# Patient Record
Sex: MALE | Race: WHITE | NOT HISPANIC OR LATINO | Employment: FULL TIME | URBAN - METROPOLITAN AREA
[De-identification: names, ages, dates, MRNs, and addresses within clinical notes are randomized per-mention and may not be internally consistent; named-entity substitution may affect disease eponyms.]

---

## 2017-12-01 ENCOUNTER — GENERIC CONVERSION - ENCOUNTER (OUTPATIENT)
Dept: OTHER | Facility: OTHER | Age: 52
End: 2017-12-01

## 2017-12-02 ENCOUNTER — APPOINTMENT (EMERGENCY)
Dept: RADIOLOGY | Facility: HOSPITAL | Age: 52
End: 2017-12-02
Payer: COMMERCIAL

## 2017-12-02 ENCOUNTER — HOSPITAL ENCOUNTER (EMERGENCY)
Facility: HOSPITAL | Age: 52
Discharge: HOME | End: 2017-12-02
Attending: EMERGENCY MEDICINE
Payer: COMMERCIAL

## 2017-12-02 VITALS
HEIGHT: 70 IN | OXYGEN SATURATION: 98 % | RESPIRATION RATE: 16 BRPM | WEIGHT: 232 LBS | BODY MASS INDEX: 33.21 KG/M2 | DIASTOLIC BLOOD PRESSURE: 89 MMHG | HEART RATE: 80 BPM | SYSTOLIC BLOOD PRESSURE: 177 MMHG

## 2017-12-02 DIAGNOSIS — R20.2 PARESTHESIAS: Primary | ICD-10-CM

## 2017-12-02 DIAGNOSIS — M47.812 DJD (DEGENERATIVE JOINT DISEASE) OF CERVICAL SPINE: ICD-10-CM

## 2017-12-02 DIAGNOSIS — M47.817 DJD (DEGENERATIVE JOINT DISEASE), LUMBOSACRAL: ICD-10-CM

## 2017-12-02 LAB
ANION GAP SERPL CALCULATED.3IONS-SCNC: 5 MMOL/L (ref 4–13)
BASOPHILS # BLD AUTO: 0.03 THOUSANDS/ΜL (ref 0–0.1)
BASOPHILS NFR BLD AUTO: 0 % (ref 0–1)
BUN SERPL-MCNC: 11 MG/DL (ref 5–25)
CA-I BLD-SCNC: 1.17 MMOL/L (ref 1.12–1.32)
CALCIUM SERPL-MCNC: 9 MG/DL (ref 8.3–10.1)
CHLORIDE SERPL-SCNC: 107 MMOL/L (ref 100–108)
CO2 SERPL-SCNC: 29 MMOL/L (ref 21–32)
CREAT SERPL-MCNC: 0.89 MG/DL (ref 0.6–1.3)
EOSINOPHIL # BLD AUTO: 0.13 THOUSAND/ΜL (ref 0–0.61)
EOSINOPHIL NFR BLD AUTO: 2 % (ref 0–6)
ERYTHROCYTE [DISTWIDTH] IN BLOOD BY AUTOMATED COUNT: 12.7 % (ref 11.6–15.1)
GFR SERPL CREATININE-BSD FRML MDRD: 98 ML/MIN/1.73SQ M
GLUCOSE SERPL-MCNC: 92 MG/DL (ref 65–140)
HCT VFR BLD AUTO: 44.4 % (ref 36.5–49.3)
HGB BLD-MCNC: 15.2 G/DL (ref 12–17)
LYMPHOCYTES # BLD AUTO: 2.63 THOUSANDS/ΜL (ref 0.6–4.47)
LYMPHOCYTES NFR BLD AUTO: 30 % (ref 14–44)
MCH RBC QN AUTO: 30.1 PG (ref 26.8–34.3)
MCHC RBC AUTO-ENTMCNC: 34.2 G/DL (ref 31.4–37.4)
MCV RBC AUTO: 88 FL (ref 82–98)
MONOCYTES # BLD AUTO: 0.61 THOUSAND/ΜL (ref 0.17–1.22)
MONOCYTES NFR BLD AUTO: 7 % (ref 4–12)
NEUTROPHILS # BLD AUTO: 5.37 THOUSANDS/ΜL (ref 1.85–7.62)
NEUTS SEG NFR BLD AUTO: 61 % (ref 43–75)
NRBC BLD AUTO-RTO: 0 /100 WBCS
PLATELET # BLD AUTO: 243 THOUSANDS/UL (ref 149–390)
PMV BLD AUTO: 10.9 FL (ref 8.9–12.7)
POTASSIUM SERPL-SCNC: 4.2 MMOL/L (ref 3.5–5.3)
RBC # BLD AUTO: 5.05 MILLION/UL (ref 3.88–5.62)
SODIUM SERPL-SCNC: 141 MMOL/L (ref 136–145)
WBC # BLD AUTO: 8.79 THOUSAND/UL (ref 4.31–10.16)

## 2017-12-02 PROCEDURE — 72100 X-RAY EXAM L-S SPINE 2/3 VWS: CPT

## 2017-12-02 PROCEDURE — 70450 CT HEAD/BRAIN W/O DYE: CPT

## 2017-12-02 PROCEDURE — 72125 CT NECK SPINE W/O DYE: CPT

## 2017-12-02 PROCEDURE — 82330 ASSAY OF CALCIUM: CPT | Performed by: EMERGENCY MEDICINE

## 2017-12-02 PROCEDURE — 85025 COMPLETE CBC W/AUTO DIFF WBC: CPT | Performed by: EMERGENCY MEDICINE

## 2017-12-02 PROCEDURE — 99284 EMERGENCY DEPT VISIT MOD MDM: CPT

## 2017-12-02 PROCEDURE — 36415 COLL VENOUS BLD VENIPUNCTURE: CPT | Performed by: EMERGENCY MEDICINE

## 2017-12-02 PROCEDURE — 80048 BASIC METABOLIC PNL TOTAL CA: CPT | Performed by: EMERGENCY MEDICINE

## 2017-12-02 RX ORDER — DICYCLOMINE HCL 20 MG
20 TABLET ORAL EVERY 8 HOURS PRN
COMMUNITY

## 2017-12-02 NOTE — DISCHARGE INSTRUCTIONS
Your CT scans, x-ray, and blood work showed chronic changes in the spine without new findings  This may be related to your numbness and should be followed further by your family doctor and Neurology  Follow up with them, as soon as possible  Return to the ER for new or worrisome symptoms  Degenerative Disc Disease   WHAT YOU NEED TO KNOW:   Degenerative disc disease happens when one or more discs between the vertebrae (bones in your spine) wear down  Discs act like a cushion between your vertebrae and help to stabilize your spine  Degenerative disc disease commonly occurs in the neck or lower back as you get older  DISCHARGE INSTRUCTIONS:   Medicines: You may need the following:  · NSAIDs , such as ibuprofen, help decrease swelling, pain, and fever  This medicine is available with or without a doctor's order  NSAIDs can cause stomach bleeding or kidney problems in certain people  If you take blood thinner medicine, always ask your healthcare provider if NSAIDs are safe for you  Always read the medicine label and follow directions  · Acetaminophen  decreases pain  It is available without a doctor's order  Ask how much to take and how often to take it  Follow directions  Acetaminophen can cause liver damage if not taken correctly  · Prescription pain medicine  may be given  Ask how to take this medicine safely  · Take your medicine as directed  Contact your healthcare provider if you think your medicine is not helping or if you have side effects  Tell him or her if you are allergic to any medicine  Keep a list of the medicines, vitamins, and herbs you take  Include the amounts, and when and why you take them  Bring the list or the pill bottles to follow-up visits  Carry your medicine list with you in case of an emergency  Follow up with your healthcare provider as directed:  Write down your questions so you remember to ask them during your visits    Go to physical therapy as directed:  A physical therapist will help you find stretches and exercises to decrease pain and improve movement and strength  He may also do spinal decompression to stretch and open the area between your vertebra  Manage your symptoms:   · Avoid activities that make your symptoms worse  Ask your healthcare provider for ways to decrease your symptoms  Certain stretches or exercises may relieve your symptoms  Ask how to stay active without further injury  · Apply heat or ice as directed  Heat or ice may help decrease pain, inflammation, and muscle spasms  · Maintain a healthy weight  If you are overweight, weight loss may help improve your symptoms  Ask your healthcare provider to help you create a weight loss plan if you are overweight  · Find ways to manage your stress  Behavioral therapy may help you learn ways to manage stress and decrease pain  Ask for more information about behavioral therapy  · Do not smoke  If you smoke, it is never too late to quit  Ask for information if you need help quitting  Contact your healthcare provider if:   · Your pain gets worse, or you cannot control it with pain medicine  · Your symptoms get worse  · You have questions or concerns about your condition or care  Return to the emergency department if:   · You have severe pain or weakness, or you cannot move your arm or leg  · You lose control of your bladder or bowels  © 2017 [employer]  Information is for End User's use only and may not be sold, redistributed or otherwise used for commercial purposes  All illustrations and images included in CareNotes® are the copyrighted property of [employer] D A [facility] , [employer]  or Aden Mendiola  The above information is an  only  It is not intended as medical advice for individual conditions or treatments  Talk to your doctor, nurse or pharmacist before following any medical regimen to see if it is safe and effective for you      Paresthesia   WHAT YOU NEED TO KNOW:   Paresthesia is numbness and tingling  It can happen in any part of your body, but usually occurs in your legs, feet, arms, or hands  There are a large number of conditions that can cause paresthesia  It affects the nerves that provide sensation and happens when there are changes in nerves or nerve pathways  These changes can be temporary, such as if you take certain medicines or you are not getting enough vitamin B  Paresthesia can become permanent when there is nerve damage  Conditions that may cause nerve damage include diabetes, carpel tunnel syndrome, stroke, and multiple sclerosis  The exact cause of your paresthesia may be unknown  DISCHARGE INSTRUCTIONS:   Medicines:  Ask for more information about medicines you may need to treat the condition causing your paresthesias  · NSAIDs  help decrease swelling and pain or fever  This medicine is available with or without a doctor's order  NSAIDs can cause stomach bleeding or kidney problems in certain people  If you take blood thinner medicine, always ask your healthcare provider if NSAIDs are safe for you  Always read the medicine label and follow directions  · Take your medicine as directed  Contact your healthcare provider if you think your medicine is not helping or if you have side effects  Tell him or her if you are allergic to any medicine  Keep a list of the medicines, vitamins, and herbs you take  Include the amounts, and when and why you take them  Bring the list or the pill bottles to follow-up visits  Carry your medicine list with you in case of an emergency  Follow up with your healthcare provider or neurologist as directed:  Write down your questions so you remember to ask them during your visits  Contact your healthcare provider or neurologist if:   · Your symptoms do not improve  · You have symptoms in more than one part of your body  · You have questions about your condition or care    Return to the emergency department if: · You have any of the following signs of a stroke:      ¨ Numbness or drooping on one side of your face     ¨ Weakness in an arm or leg    ¨ Confusion or difficulty speaking    ¨ Dizziness, a severe headache, or vision loss    · You are not able to control your urine or bowel movements  · You have severe pain along with numbness and tingling  · Your legs suddenly become cold  You have trouble moving your legs, and they ache  · You have increased weakness in a part of your body  · You have uncontrolled movements, or you have a seizure  © 2017 [employer]  Information is for End User's use only and may not be sold, redistributed or otherwise used for commercial purposes  All illustrations and images included in CareNotes® are the copyrighted property of A D A [employer]  or [employer]  The above information is an  only  It is not intended as medical advice for individual conditions or treatments  Talk to your doctor, nurse or pharmacist before following any medical regimen to see if it is safe and effective for you

## 2017-12-02 NOTE — ED PROVIDER NOTES
History  Chief Complaint   Patient presents with    Numbness     Pt c/o numbness and tingling in bilateral legs for about one year or so but this past Tuesday or Wednesday patient started with bilateral arm tingling and numbness  Pt c/o bilateral knee/leg pain  50-year-old man with a history of IBS presents for evaluation of numbness  Patient reports a 1-2 year history of intermittent numbness involving the anterolateral aspect of bilateral thighs  He has developed numbness involving bilateral upper extremities and head that started over the past week without inciting event  Patient denies associated neck pain and new back pain  No associated fevers, chills, night sweats, weakness, saddle anesthesia, or loss of bladder/bowel function  He has been followed by his PCP for this issue and was recently referred to Neurology for further evaluation  He spoke with his primary doctor today who recommended that he come to the ED for evaluation  He denies modifying factors  He is able to ambulate normally and recently ran a 5K  No recent tight fitting belts or other clothing  No recent trauma  On arrival, patient is hypertensive to 177/89 with otherwise normal vital signs  Physical exam is unremarkable including a normal neurologic exam including sensory testing  No clear etiology at this time  Will check basic labs to evaluate for electrolyte abnormalities  Will check lumbar x-ray and CT head/C-spine to evaluate for masses and bony lesions that may be contributing to symptoms  Prior to Admission Medications   Prescriptions Last Dose Informant Patient Reported?  Taking?   dicyclomine (BENTYL) 20 mg tablet 12/1/2017 at Unknown time  Yes Yes   Sig: Take 20 mg by mouth every 8 (eight) hours as needed      Facility-Administered Medications: None       Past Medical History:   Diagnosis Date    IBS (irritable bowel syndrome)        Past Surgical History:   Procedure Laterality Date    COLONOSCOPY W/ POLYPECTOMY         Family History   Problem Relation Age of Onset    Heart disease Mother     Heart disease Father      I have reviewed and agree with the history as documented  Social History   Substance Use Topics    Smoking status: Never Smoker    Smokeless tobacco: Never Used      Comment: pt is a non-smoker    Alcohol use Yes      Comment: occasional social drink        Review of Systems   Constitutional: Negative for chills and fever  HENT: Negative for rhinorrhea and sore throat  Eyes: Negative for photophobia and visual disturbance  Respiratory: Negative for cough and shortness of breath  Cardiovascular: Negative for chest pain and leg swelling  Gastrointestinal: Positive for diarrhea  Negative for abdominal pain, blood in stool, nausea and vomiting  Genitourinary: Negative for difficulty urinating, dysuria, frequency and hematuria  Musculoskeletal: Negative for back pain, gait problem and neck pain  Skin: Negative for rash and wound  Neurological: Positive for numbness  Negative for weakness, light-headedness and headaches  Physical Exam  ED Triage Vitals   Temp Pulse Respirations Blood Pressure SpO2   -- 12/02/17 0858 12/02/17 0858 12/02/17 0901 12/02/17 0858    80 16 (!) 177/89 98 %      Temp src Heart Rate Source Patient Position - Orthostatic VS BP Location FiO2 (%)   -- 12/02/17 0858 12/02/17 0858 12/02/17 0858 --    Monitor Sitting Left arm       Pain Score       12/02/17 0858       No Pain           Orthostatic Vital Signs  Vitals:    12/02/17 0858 12/02/17 0901   BP:  (!) 177/89   Pulse: 80    Patient Position - Orthostatic VS: Sitting Sitting       Physical Exam   Constitutional: He is oriented to person, place, and time  He appears well-developed and well-nourished  No distress  HENT:   Head: Normocephalic and atraumatic  Eyes: Conjunctivae and EOM are normal  Pupils are equal, round, and reactive to light  No scleral icterus  Neck: Neck supple   No JVD present  Cardiovascular: Normal rate, regular rhythm and normal heart sounds  Exam reveals no gallop and no friction rub  No murmur heard  Pulmonary/Chest: Effort normal and breath sounds normal  No respiratory distress  He has no wheezes  He has no rales  Abdominal: Soft  He exhibits no distension  There is no tenderness  There is no rebound and no guarding  Musculoskeletal: He exhibits no edema or tenderness  Neurological: He is alert and oriented to person, place, and time  He displays normal reflexes  No cranial nerve deficit  No gross motor or sensory deficits, standing and ambulating without difficulty   Skin: Skin is warm and dry  He is not diaphoretic  Psychiatric: He has a normal mood and affect  His behavior is normal  Thought content normal    Vitals reviewed  ED Medications  Medications - No data to display    Diagnostic Studies  Results Reviewed     Procedure Component Value Units Date/Time    Basic metabolic panel [[id number]] Collected:  12/02/17 1251    Lab Status:  Final result Specimen:  Blood from Arm, Left Updated:  12/02/17 1322     Sodium 141 mmol/L      Potassium 4 2 mmol/L      Chloride 107 mmol/L      CO2 29 mmol/L      Anion Gap 5 mmol/L      BUN 11 mg/dL      Creatinine 0 89 mg/dL      Glucose 92 mg/dL      Calcium 9 0 mg/dL      eGFR 98 ml/min/1 73sq m     Narrative:         National Kidney Disease Education Program recommendations are as follows:  GFR calculation is accurate only with a steady state creatinine  Chronic Kidney disease less than 60 ml/min/1 73 sq  meters  Kidney failure less than 15 ml/min/1 73 sq  meters      Calcium, ionized [[id number]]  (Normal) Collected:  12/02/17 1251    Lab Status:  Final result Specimen:  Blood from Arm, Left Updated:  12/02/17 1313     Calcium, Ionized 1 17 mmol/L     CBC and differential [[id number]]  (Normal) Collected:  12/02/17 1251    Lab Status:  Final result Specimen:  Blood from Arm, Left Updated:  12/02/17 1310     WBC 8 79 Thousand/uL      RBC 5 05 Million/uL      Hemoglobin 15 2 g/dL      Hematocrit 44 4 %      MCV 88 fL      MCH 30 1 pg      MCHC 34 2 g/dL      RDW 12 7 %      MPV 10 9 fL      Platelets 513 Thousands/uL      nRBC 0 /100 WBCs      Neutrophils Relative 61 %      Lymphocytes Relative 30 %      Monocytes Relative 7 %      Eosinophils Relative 2 %      Basophils Relative 0 %      Neutrophils Absolute 5 37 Thousands/µL      Lymphocytes Absolute 2 63 Thousands/µL      Monocytes Absolute 0 61 Thousand/µL      Eosinophils Absolute 0 13 Thousand/µL      Basophils Absolute 0 03 Thousands/µL                  XR spine lumbar 2 or 3 views injury   ED Interpretation by Nayla Mcpherson MD (12/02 1327)   DJD with decreased disc space L4-L5 and L5-S1  No other findings observed  Final Result by Rufino Bellamy MD (12/02 1501)         1  No acute osseous abnormality  2  Moderate degenerative disc disease at L4-L5 and mild degenerative disc disease at L5-S1  Workstation performed: [id number]         CT spine cervical without contrast   Final Result by Tam Badillo MD (12/02 1303)      No cervical spine fracture or traumatic malalignment  Workstation performed: [id number]         CT head without contrast   Final Result by Tam Badillo MD (12/02 1300)      No acute intracranial abnormality  Workstation performed: [id number]               Procedures  Procedures      Phone Consults  ED Phone Contact    ED Course  ED Course                                MDM  Number of Diagnoses or Management Options  DJD (degenerative joint disease) of cervical spine:   DJD (degenerative joint disease), lumbosacral:   Paresthesias:   Diagnosis management comments: Workup including CT head/c-spine, xray l-spine, and basic labs unremarkable for acute findings  No weakness, saddle anesthesia, or bladder/bowel incontinence  Unclear etiology at this time   Patient was discharged with outpatient PCP and neurology follow up  CritCare Time    Disposition  Final diagnoses:   DJD (degenerative joint disease) of cervical spine   DJD (degenerative joint disease), lumbosacral   Paresthesias     Time reflects when diagnosis was documented in both MDM as applicable and the Disposition within this note     Time User Action Codes Description Comment    12/2/2017  1:30 PM IselaadriJyotsna castillo Running Add [A33 087] DJD (degenerative joint disease) of cervical spine     12/2/2017  1:30 PM Jyotsna Singh Running Add [M51 37] DJD (degenerative joint disease), lumbosacral     12/2/2017  1:30 PM DhirajongmichadriJyotsna castillo Running Add [R20 2] Paresthesias     12/2/2017  1:30 PM Jyotsna Singh Running Modify [Y47 736] DJD (degenerative joint disease) of cervical spine     12/2/2017  1:30 PM IselaadriJyotsna castillo Running Modify [R20 2] Paresthesias       ED Disposition     ED Disposition Condition Comment    Discharge  Karlie Osorio [facility] discharge to home/self care  Condition at discharge: Good        Follow-up Information     Follow up With Specialties Details Why Contact Info    Tashia Cisneros MD Internal Medicine  As needed [facility] [id number]  [address] Neurology DEPARTMENT OF [facility]  Schedule an appointment as soon as possible for a visit For further evaluation and treatment Sunny [address] 53  [phone]        Discharge Medication List as of 12/2/2017  1:32 PM      CONTINUE these medications which have NOT CHANGED    Details   dicyclomine (BENTYL) 20 mg tablet Take 20 mg by mouth every 8 (eight) hours as needed, Historical Med           No discharge procedures on file  ED Provider  Attending physically available and evaluated Royce Lee  CECILIA managed the patient along with the ED Attending      Electronically Signed by         Adalgisa Jorgensen MD  Resident  12/03/17 2361

## 2017-12-02 NOTE — ED ATTENDING ATTESTATION
Lowell Bledsoe DO, saw and evaluated the patient  I have discussed the patient with the resident/non-physician practitioner and agree with the resident's/non-physician practitioner's findings, Plan of Care, and MDM as documented in the resident's/non-physician practitioner's note, except where noted  All available labs and Radiology studies were reviewed  At this point I agree with the current assessment done in the Emergency Department  I have conducted an independent evaluation of this patient a history and physical is as follows:    47 yom with numbness and tingling with paresthesia in bilateral anterior thighs worsening over the course of several years  +h/o L2-3 DJD  Now for one week has also had tingling in bilateral arms and face  His PCP evaluated him and referred him to neurology and his working on an office appointment  Last night, symptoms worsened and he called his PCP who referred him to the ED    Symptoms worsen with standing but did not limit him from recently running a 5k  Patient states that his symptoms have all resolved at this time  Paresthesia in ant right thigh reproduced with ambulation    No urinary incontinence or saddle anesthesia    BP (!) 177/89   Pulse 80   Resp 16   Ht 5' 10" (1 778 m)   Wt 105 kg (232 lb)   SpO2 98%   BMI 33 29 kg/m²   Neuro intact  Ambulates well  No respiratory distress  Heart regular  abd benign  Ambulates well       differential diagnosis includes degenerative joint disease, degenerative disc disease, arthritis, spinal stenosis, spondylolisthesis, herniated discs, metastatic cancer to spine, Ms, other neurological disorder  Based on the patient's arrangements to follow up with Neurology, we will screen him with basic imaging including CT of head, C-spine, x-ray of L-spine, and basic lab work  The patient is stable and will have to follow up with Neurology for more detailed imaging, such as MRI of brain and spine  If indicated     CT of head, C-spine were negative; x-ray of lumbar spine was negative  He ambulated well    He was happy with evaluation and willing to follow up with his neurologist that he was recently referred to      diagnosis   diffuse, intermittent paresthesias    Critical Care Time  CritCare Time

## 2017-12-02 NOTE — ED PROVIDER NOTES
History  Chief Complaint   Patient presents with    Numbness     Pt c/o numbness and tingling in bilateral legs for about one year or so but this past Tuesday or Wednesday patient started with bilateral arm tingling and numbness  Pt c/o bilateral knee/leg pain  48 y/o M presents for evaluation of LE numbness  Patient states that he gets numbness intermittently in lateral anterior thigh  Initially started R, now bilateral  Usually associated with long standing, better with sitting  No medications tried at home for symptomatic relief  Spoke to PCP previously, was placed on Vitamin D without resolution  Pt saw neurology of Tuesday and             None       Past Medical History:   Diagnosis Date    IBS (irritable bowel syndrome)        Past Surgical History:   Procedure Laterality Date    COLONOSCOPY W/ POLYPECTOMY         Family History   Problem Relation Age of Onset    Heart disease Mother     Heart disease Father      I have reviewed and agree with the history as documented      Social History   Substance Use Topics    Smoking status: Never Smoker    Smokeless tobacco: Never Used      Comment: pt is a non-smoker    Alcohol use Yes      Comment: occasional social drink        Review of Systems    Physical Exam  ED Triage Vitals   Temp Pulse Respirations Blood Pressure SpO2   -- 12/02/17 0858 12/02/17 0858 12/02/17 0901 12/02/17 0858    80 16 (!) 177/89 98 %      Temp src Heart Rate Source Patient Position - Orthostatic VS BP Location FiO2 (%)   -- 12/02/17 0858 12/02/17 0858 12/02/17 0858 --    Monitor Sitting Left arm       Pain Score       12/02/17 0858       No Pain           Orthostatic Vital Signs  Vitals:    12/02/17 0858 12/02/17 0901   BP:  (!) 177/89   Pulse: 80    Patient Position - Orthostatic VS: Sitting Sitting       Physical Exam    ED Medications  Medications - No data to display    Diagnostic Studies  Results Reviewed     None                 No orders to display Procedures  Procedures      Phone Consults  ED Phone Contact    ED Course  ED Course                                MDM  CritCare Time    Disposition  Final diagnoses:   None     ED Disposition     None      Follow-up Information    None       Patient's Medications    No medications on file     No discharge procedures on file  ED Provider  Attending physically available and evaluated Bela Puri I managed the patient along with the ED Attending      Electronically Signed by

## 2017-12-06 ENCOUNTER — TRANSCRIBE ORDERS (OUTPATIENT)
Dept: ADMINISTRATIVE | Facility: HOSPITAL | Age: 52
End: 2017-12-06

## 2017-12-06 DIAGNOSIS — M54.16 LUMBAR RADICULOPATHY: ICD-10-CM

## 2017-12-06 DIAGNOSIS — M54.5 LOW BACK PAIN, UNSPECIFIED BACK PAIN LATERALITY, UNSPECIFIED CHRONICITY, WITH SCIATICA PRESENCE UNSPECIFIED: ICD-10-CM

## 2017-12-06 DIAGNOSIS — R20.2 PARESTHESIA: Primary | ICD-10-CM

## 2017-12-08 ENCOUNTER — GENERIC CONVERSION - ENCOUNTER (OUTPATIENT)
Dept: OTHER | Facility: OTHER | Age: 52
End: 2017-12-08

## 2017-12-08 ENCOUNTER — HOSPITAL ENCOUNTER (OUTPATIENT)
Dept: MRI IMAGING | Facility: HOSPITAL | Age: 52
Discharge: HOME | End: 2017-12-08
Payer: COMMERCIAL

## 2017-12-08 DIAGNOSIS — M54.16 LUMBAR RADICULOPATHY: ICD-10-CM

## 2017-12-08 DIAGNOSIS — R20.2 PARESTHESIA: ICD-10-CM

## 2017-12-08 DIAGNOSIS — M54.5 LOW BACK PAIN, UNSPECIFIED BACK PAIN LATERALITY, UNSPECIFIED CHRONICITY, WITH SCIATICA PRESENCE UNSPECIFIED: ICD-10-CM

## 2017-12-08 PROCEDURE — 72148 MRI LUMBAR SPINE W/O DYE: CPT

## 2018-01-03 ENCOUNTER — GENERIC CONVERSION - ENCOUNTER (OUTPATIENT)
Dept: OTHER | Facility: OTHER | Age: 53
End: 2018-01-03

## 2018-01-03 DIAGNOSIS — M47.816 SPONDYLOSIS OF LUMBAR REGION WITHOUT MYELOPATHY OR RADICULOPATHY: ICD-10-CM

## 2018-01-17 ENCOUNTER — GENERIC CONVERSION - ENCOUNTER (OUTPATIENT)
Dept: OTHER | Facility: OTHER | Age: 53
End: 2018-01-17

## 2018-01-17 ENCOUNTER — APPOINTMENT (OUTPATIENT)
Dept: PHYSICAL THERAPY | Facility: CLINIC | Age: 53
End: 2018-01-17
Payer: COMMERCIAL

## 2018-01-17 PROCEDURE — 97162 PT EVAL MOD COMPLEX 30 MIN: CPT

## 2018-01-17 PROCEDURE — 97535 SELF CARE MNGMENT TRAINING: CPT

## 2018-01-17 PROCEDURE — G8990 OTHER PT/OT CURRENT STATUS: HCPCS

## 2018-01-17 PROCEDURE — G8991 OTHER PT/OT GOAL STATUS: HCPCS

## 2018-01-22 ENCOUNTER — APPOINTMENT (OUTPATIENT)
Dept: PHYSICAL THERAPY | Facility: CLINIC | Age: 53
End: 2018-01-22
Payer: COMMERCIAL

## 2018-01-22 PROCEDURE — 97110 THERAPEUTIC EXERCISES: CPT

## 2018-01-24 VITALS
BODY MASS INDEX: 33.34 KG/M2 | WEIGHT: 238.13 LBS | DIASTOLIC BLOOD PRESSURE: 88 MMHG | SYSTOLIC BLOOD PRESSURE: 136 MMHG | RESPIRATION RATE: 16 BRPM | HEIGHT: 71 IN | TEMPERATURE: 98.1 F | HEART RATE: 89 BPM

## 2018-01-25 ENCOUNTER — OFFICE VISIT (OUTPATIENT)
Dept: PHYSICAL THERAPY | Facility: CLINIC | Age: 53
End: 2018-01-25
Payer: COMMERCIAL

## 2018-01-25 DIAGNOSIS — G89.29 CHRONIC BILATERAL LOW BACK PAIN WITH RIGHT-SIDED SCIATICA: Primary | ICD-10-CM

## 2018-01-25 DIAGNOSIS — M54.41 CHRONIC BILATERAL LOW BACK PAIN WITH RIGHT-SIDED SCIATICA: Primary | ICD-10-CM

## 2018-01-25 PROCEDURE — 97110 THERAPEUTIC EXERCISES: CPT

## 2018-01-25 NOTE — PROGRESS NOTES
Daily Note     Today's date: 2018  Patient name: Elena Amaya  : 1965  MRN: [id number]  Referring provider: Fadumo Reis  Dx:   Encounter Diagnosis   Name Primary?  Chronic bilateral low back pain with right-sided sciatica Yes                  Subjective: Patient states he has increased centralized low back pain after spending two days in [place] and in transit  He reports continuing numbness through hip and thigh  Pain is rated 5/10 coming into therapy today  Objective: Some progression of program today with addition of Supine ABD crunch and progression to yellow ball  Instructed patient in seated sciatic nerve glides to reduce radiculopathy  A printed handout was provided to the patient  Patient demonstrates good understanding of exercises  No exacerbation of symptoms is noted throughout TE today  See treatment diary below  Precautions: None    Daily Treatment Diary     Manual              N/A N/A                                                                    Exercise Diary              Treadmill 3 4 mph x 10'            Elliptical             TB Trunk Rotation             Hip AB/AD AB 60#  AD 60#  2x10            Seated Tball LAQ 2 0# 2x10            Seated Tball March 2 0# 2x10            Seated Tball Trunk Rotation Yellow 2x10            Seated Tball D1 D2 Rotation Yellow 2x10            Bosu March             BOSU SLR             Hamstring Stretch HEP            Piriformis, Seated & Supine Modified HEP            Seated Sciatic Nerve Glides 20 HEP            Prone OAL             Supine Tball ABD Crunch Orange 2x10 3 sec            PPT             Bridge w/ Tball 2x10            S/L Bridge             DKTC w/ Tball 10X            LTR w/ Tball 10x                Modalities              MHP LB Seated x15 min                                                Assessment: Tolerated treatment well  Patient exhibited good technqiue with therapeutic exercises   No exacerbation of symptoms with progression of program       Plan: Progress treatment as tolerated  Patient will benefit from continued skilled therapy to improve lumbar stabilization, increase strength and reduce pain & LOF

## 2018-01-29 ENCOUNTER — OFFICE VISIT (OUTPATIENT)
Dept: PHYSICAL THERAPY | Facility: CLINIC | Age: 53
End: 2018-01-29
Payer: COMMERCIAL

## 2018-01-29 DIAGNOSIS — M54.41 CHRONIC BILATERAL LOW BACK PAIN WITH RIGHT-SIDED SCIATICA: Primary | ICD-10-CM

## 2018-01-29 DIAGNOSIS — G89.29 CHRONIC BILATERAL LOW BACK PAIN WITH RIGHT-SIDED SCIATICA: Primary | ICD-10-CM

## 2018-01-29 PROCEDURE — 97110 THERAPEUTIC EXERCISES: CPT

## 2018-01-29 NOTE — PROGRESS NOTES
Daily Note     Today's date: 2018  Patient name: Manda Prather  : 1965  MRN: [id number]  Referring provider: Ian Cruz  Dx:   Encounter Diagnosis   Name Primary?  Chronic bilateral low back pain with right-sided sciatica Yes                  Subjective: Patient states he attended a car show over the weekend and experienced numbness through hip and thigh with all the extended walking  He took seated rests and performed nerve glides which lessened the symptoms, however they returned after walking for a period  Pain is rated 5/10 coming into therapy today  Objective:  Patient demonstrates good understanding of exercises  No exacerbation of symptoms is noted throughout TE today  Increased weight on machines today & added dead bug to progress program  See treatment diary below  Precautions: None    Daily Treatment Diary     Manual             N/A N/A                                                                    Exercise Diary             Treadmill 3 4 mph x 10' 3 4 mph x 10'           Leg Press/Calf Raises 90# 2x10 100# 2x10           TB Trunk Rotation  HEP           Hip AB/AD AB 60#  AD 60#  2x10 AB 60+1# AD 60+1# 2x10           Seated Tball LAQ 2 0# 2x10            Seated Tball March 2 0# 2x10            Seated Tball Trunk Rotation Yellow 2x10            Seated Tball D1 D2 Rotation Yellow 2x10            Bosu March             BOSU SLR             Hamstring Stretch HEP            Piriformis, Seated & Supine Modified HEP            Seated Sciatic Nerve Glides 20 HEP            Prone OAL             Supine Tball ABD Crunch Orange 2x10 3 sec Orange 2x10 3 sec           Dead Bug  2x5           Bridge w/ Tball 2x10 2x10           S/L Bridge  HEP           DKTC w/ Tball 10X 10x           LTR w/ Tball 10x 10x               Modalities             MHP LB Seated x15 min declined                                               Assessment: Tolerated treatment well  Patient exhibited good technqiue with therapeutic exercises  No exacerbation of symptoms with progression of program       Plan: Progress treatment as tolerated  Patient will benefit from continued skilled therapy to improve lumbar stabilization, increase strength and reduce pain & LOF

## 2018-02-01 ENCOUNTER — OFFICE VISIT (OUTPATIENT)
Dept: PHYSICAL THERAPY | Facility: CLINIC | Age: 53
End: 2018-02-01
Payer: COMMERCIAL

## 2018-02-01 DIAGNOSIS — G89.29 CHRONIC BILATERAL LOW BACK PAIN WITH RIGHT-SIDED SCIATICA: Primary | ICD-10-CM

## 2018-02-01 DIAGNOSIS — M54.41 CHRONIC BILATERAL LOW BACK PAIN WITH RIGHT-SIDED SCIATICA: Primary | ICD-10-CM

## 2018-02-01 PROCEDURE — 97110 THERAPEUTIC EXERCISES: CPT

## 2018-02-01 NOTE — PROGRESS NOTES
Daily Note     Today's date: 2018  Patient name: Monica Gtz  : 1965  MRN: [id number]  Referring provider: Beni Kumari  Dx:   Encounter Diagnosis   Name Primary?  Chronic bilateral low back pain with right-sided sciatica Yes                  Subjective: Patient denies any pain coming into therapy  He states radicular neuropathy is decreased overall but still present  Objective:  Patient demonstrates good understanding of exercises  Home Exercise Program was reviewed with patient  See treatment diary below  Precautions: None    Daily Treatment Diary     Manual            N/A N/A  N/A                                                                  Exercise Diary            Treadmill 3 4 mph x 10' 3 4 mph x 10' 3 4 mph x 10'          Leg Press/Calf Raises 90# 2x10 100# 2x10 100# 2x10          TB Trunk Rotation  HEP           Hip AB/AD AB 60#  AD 60#  2x10 AB 60+1# AD 60+1# 2x10 AB 60+1# AD 60+1# 2x10          Seated Tball LAQ 2 0# 2x10  2 5# 2x10          Seated Tball March 2 0# 2x10  2 5# 2x10          Seated Tball Trunk Rotation Yellow 2x10  Yellow 2x10          Seated Tball D1 D2 Rotation Yellow 2x10  Yellow 2x10          Bosu March             BOSU SLR             Hamstring Stretch HEP            Piriformis, Seated & Supine Modified HEP            Seated Sciatic Nerve Glides 20 HEP            Prone OAL             Supine Tball ABD Crunch Orange 2x10 3 sec Orange 2x10 3 sec Orange 2x10 3 sec          Dead Bug  2x5 2x10          Bridge w/ Tball 2x10 2x10 2x10          S/L Bridge  HEP           DKTC w/ Tball 10X 10x 10x          LTR w/ Tball 10x 10x 10x              Modalities            MHP LB Seated x15 min declined declined                                              Assessment: Tolerated treatment well  Patient exhibited good technqiue with therapeutic exercises   No exacerbation of symptoms with completion of today's program  HEP was updated and a printed copy was provided to  the patient  Plan: Progress treatment as tolerated  Patient will benefit from continued skilled therapy to improve lumbar stabilization, increase strength and reduce pain & LOF

## 2018-02-05 ENCOUNTER — OFFICE VISIT (OUTPATIENT)
Dept: PHYSICAL THERAPY | Facility: CLINIC | Age: 53
End: 2018-02-05
Payer: COMMERCIAL

## 2018-02-05 DIAGNOSIS — M54.41 CHRONIC BILATERAL LOW BACK PAIN WITH RIGHT-SIDED SCIATICA: Primary | ICD-10-CM

## 2018-02-05 DIAGNOSIS — G89.29 CHRONIC BILATERAL LOW BACK PAIN WITH RIGHT-SIDED SCIATICA: Primary | ICD-10-CM

## 2018-02-05 PROCEDURE — 97140 MANUAL THERAPY 1/> REGIONS: CPT

## 2018-02-05 PROCEDURE — 97110 THERAPEUTIC EXERCISES: CPT

## 2018-02-05 NOTE — PROGRESS NOTES
Daily Note     Today's date: 2018  Patient name: Lynda Tyler  : 1965  MRN: [id number]  Referring provider: Romayne Daring  Dx:   Encounter Diagnosis   Name Primary?  Chronic bilateral low back pain with right-sided sciatica Yes                  Subjective: Patient states he had some numbness and radicular symptoms after extended walking this weekend  But overall sees an improvement  Objective:  Able to progress program with increase in weight on machines today  See treatment diary below  Precautions: None    Daily Treatment Diary     Manual           N/A N/A  N/A N/A                                                                 Exercise Diary   2/         Treadmill 3 4 mph x 10' 3 4 mph x 10' 3 4 mph x 10' 3 4 mph x 10'         Leg Press/Calf Raises 90# 2x10 100# 2x10 100# 2x10 110# 2x10         TB Trunk Rotation  HEP           Hip AB/AD AB 60#  AD 60#  2x10 AB 60+1# AD 60+1# 2x10 AB 60+1# AD 60+1# 2x10 AB 75# AD 75# 2x10         Seated Tball LAQ 2 0# 2x10  2 5# 2x10 2 5# 2x10         Seated Tball March 2 0# 2x10  2 5# 2x10 2 5# 2x10         Seated Tball Trunk Rotation Yellow 2x10  Yellow 2x10 Yellow 2x10         Seated Tball D1 D2 Rotation Yellow 2x10  Yellow 2x10 Yellow 2x10         Bosu March             BOSU SLR             Hamstring Stretch HEP            Piriformis, Seated & Supine Modified HEP            Seated Sciatic Nerve Glides 20 HEP            Prone OAL             Supine Tball ABD Crunch Orange 2x10 3 sec Orange 2x10 3 sec Orange 2x10 3 sec Orange 2x10 3 sec         Dead Bug  2x5 2x10 2x10         Bridge w/ Tball 2x10 2x10 2x10 2x10         S/L Bridge  HEP           DKTC w/ Tball 10X 10x 10x 10x         LTR w/ Tball 10x 10x 10x 10x             Modalities   2/5         MHP LB Seated x15 min declined declined declined                                             Assessment: Tolerated treatment well   Patient exhibited good heriberto with therapeutic exercises  No exacerbation of symptoms with completion of today's program  He continues to progress well towards goals  Plan: Progress treatment as tolerated  Patient will benefit from continued skilled therapy to improve lumbar stabilization, increase strength and reduce pain & LOF

## 2018-02-07 ENCOUNTER — OFFICE VISIT (OUTPATIENT)
Dept: PHYSICAL THERAPY | Facility: CLINIC | Age: 53
End: 2018-02-07
Payer: COMMERCIAL

## 2018-02-07 DIAGNOSIS — G89.29 CHRONIC BILATERAL LOW BACK PAIN WITH RIGHT-SIDED SCIATICA: Primary | ICD-10-CM

## 2018-02-07 DIAGNOSIS — M54.41 CHRONIC BILATERAL LOW BACK PAIN WITH RIGHT-SIDED SCIATICA: Primary | ICD-10-CM

## 2018-02-07 PROCEDURE — 97140 MANUAL THERAPY 1/> REGIONS: CPT | Performed by: PHYSICAL THERAPIST

## 2018-02-07 PROCEDURE — 97110 THERAPEUTIC EXERCISES: CPT | Performed by: PHYSICAL THERAPIST

## 2018-02-07 NOTE — PROGRESS NOTES
Daily Note     Today's date: 2018  Patient name: Kiara Nash  : 1965  MRN: [id number]  Referring provider: Stormy Hammans  Dx:   Encounter Diagnosis   Name Primary?  Chronic bilateral low back pain with right-sided sciatica Yes                  Subjective: Patient reports the numbness in the legs is coming down  Patient reports he is going away for a few days and is curious how the back and legs will respond with the increase walking    Patient reports no pain in the low back at start of session       Objective: See treatment diary below    Daily Treatment Diary     Manual          Bilateral hip and LE stretching with bilateral hip traction N/A  N/A N/A 15 min                                                                Exercise Diary          Treadmill 3 4 mph x 10' 3 4 mph x 10' 3 4 mph x 10' 3 4 mph x 10' 3 3 mph  10 min        Leg Press/Calf Raises 90# 2x10 100# 2x10 100# 2x10 110# 2x10 110#  2x10        TB Trunk Rotation  HEP           Hip AB/AD AB 60#  AD 60#  2x10 AB 60+1# AD 60+1# 2x10 AB 60+1# AD 60+1# 2x10 AB 75# AD 75# 2x10 75+1#  AB  75#/AD  2x10        Seated Tball LAQ 2 0# 2x10  2 5# 2x10 2 5# 2x10 3 0#  2x10        Seated Tball March 2 0# 2x10  2 5# 2x10 2 5# 2x10 3 0#  2x10        Seated Tball Trunk Rotation Yellow 2x10  Yellow 2x10 Yellow 2x10 Yellow   2x10        Tball Seated Tball D1and D2 Trunk Rotation Yellow 2x10  Yellow 2x10 Yellow 2x10 Yellow  2x10        Bosu March     3 min        BOSU SLR     10x Bilat        Hamstring Stretch HEP            Piriformis, Seated & Supine Modified HEP            Seated Sciatic Nerve Glides 20 HEP            Prone OAL             Supine Tball ABD Crunch Orange 2x10 3 sec Orange 2x10 3 sec Orange 2x10 3 sec Orange 2x10 3 sec Orange  2x10  3 sec        Dead Bug  2x5 2x10 2x10 2x10  Bilat        Bridge w/ Tball 2x10 2x10 2x10 2x10 2x10  Orange Tball        S/L Bridge  HEP           DKTC w/ Tball 10X 10x 10x 10x 10x        LTR w/ Tball 10x 10x 10x 10x 10x            Modalities  1/25 1/29 2/1 2/5 2/7        MHP LB Seated x15 min declined declined declined Pt declined                                         Assessment: Tolerated treatment well  PT notes continuation of progression of TE program with focus on lumbar stabilization to decrease symptoms and improve functional limitations to meet therapy goals  PT notes addition of BOSU march and BOSU SLR to address strength and trunk/core deficits to meet therapy goals  PT will plan on RE next week to update POC and PT feels the patient is progressing toward therapy and will plan on DC with HEP in 1-2 weeks to update/review HEP  Plan: Continue per plan of care

## 2018-02-07 NOTE — LETTER
2018    Luzma Johnson PA-C  [address] [facility]    Patient: Janina Angeles   YOB: 1965   Date of Visit: 2018     Encounter Diagnosis     ICD-10-CM    1  Chronic bilateral low back pain with right-sided sciatica M54 41     G89 29        Dear Dr Lira Model:    Please review the attached Plan of Care from [facility] recent visit  Please verify that you agree that skilled therapy will be discharged by signing the attached document and sending it back to our office  If you have any questions or concerns, please don't hesitate to call  Sincerely,    Vlad Christianson, PT      Referring Provider:      I certify that I have read the below Plan of Care and certify the need for these services furnished under this plan of treatment while under my care  Luzma Johnson PA-C  [facility] [address]  [address] [place]          Daily Note     Today's date: 2018  Patient name: Janina Angeles  : 1965  MRN: [id number]  Referring provider: Uma Burgos  Dx:   Encounter Diagnosis   Name Primary?  Chronic bilateral low back pain with right-sided sciatica Yes                  Subjective: Patient reports the numbness in the legs is coming down  Patient reports he is going away for a few days and is curious how the back and legs will respond with the increase walking    Patient reports no pain in the low back at start of session       Objective: See treatment diary below    Daily Treatment Diary     Manual          Bilateral hip and LE stretching with bilateral hip traction N/A  N/A N/A 15 min                                                                Exercise Diary          Treadmill 3 4 mph x 10' 3 4 mph x 10' 3 4 mph x 10' 3 4 mph x 10' 3 3 mph  10 min        Leg Press/Calf Raises 90# 2x10 100# 2x10 100# 2x10 110# 2x10 110#  2x10        TB Trunk Rotation  HEP           Hip AB/AD AB 60#  AD 60#  2x10 AB 60+1# AD 60+1# 2x10 AB 60+1# AD 60+1# 2x10 AB 75# AD 75# 2x10 75+1#  AB  75#/AD  2x10        Seated Tball LAQ 2 0# 2x10  2 5# 2x10 2 5# 2x10 3 0#  2x10        Seated Tball March 2 0# 2x10  2 5# 2x10 2 5# 2x10 3 0#  2x10        Seated Tball Trunk Rotation Yellow 2x10  Yellow 2x10 Yellow 2x10 Yellow   2x10        Tball Seated Tball D1and D2 Trunk Rotation Yellow 2x10  Yellow 2x10 Yellow 2x10 Yellow  2x10        Bosu March     3 min        BOSU SLR     10x Bilat        Hamstring Stretch HEP            Piriformis, Seated & Supine Modified HEP            Seated Sciatic Nerve Glides 20 HEP            Prone OAL             Supine Tball ABD Crunch Orange 2x10 3 sec Orange 2x10 3 sec Orange 2x10 3 sec Orange 2x10 3 sec Orange  2x10  3 sec        Dead Bug  2x5 2x10 2x10 2x10  Bilat        Bridge w/ Tball 2x10 2x10 2x10 2x10 2x10  Orange Tball        S/L Bridge  HEP           DKTC w/ Tball 10X 10x 10x 10x 10x        LTR w/ Tball 10x 10x 10x 10x 10x            Modalities          MHP LB Seated x15 min declined declined declined Pt declined                                         Assessment: Tolerated treatment well  PT notes continuation of progression of TE program with focus on lumbar stabilization to decrease symptoms and improve functional limitations to meet therapy goals  PT notes addition of BOSU march and BOSU SLR to address strength and trunk/core deficits to meet therapy goals  PT will plan on RE next week to update POC and PT feels the patient is progressing toward therapy and will plan on DC with HEP in 1-2 weeks to update/review HEP  Plan: Continue per plan of care  PT Discharge    Today's date: 3/21/2018  Patient name: Kathy Gama  : 1965  MRN: [id number]  Referring provider: New Saucedo  Dx:   Encounter Diagnosis     ICD-10-CM    1   Chronic bilateral low back pain with right-sided sciatica M54 41     G89 29        Start Time: 1030  Stop Time: 1145  Total time in clinic (min): 75 minutes    Assessment    Assessment details: PT notes decision to DC with HEP secondary to expiration of PT prescription  Plan  Plan details: DC from skilled therapy  Subjective Evaluation    History of Present Illness  Mechanism of injury: PT notes the patient with no further contact with clinic to update status or re-schedule appointments           Objective        Precautions:  None

## 2018-02-14 ENCOUNTER — APPOINTMENT (OUTPATIENT)
Dept: PHYSICAL THERAPY | Facility: CLINIC | Age: 53
End: 2018-02-14
Payer: COMMERCIAL

## 2018-02-15 ENCOUNTER — APPOINTMENT (OUTPATIENT)
Dept: PHYSICAL THERAPY | Facility: CLINIC | Age: 53
End: 2018-02-15
Payer: COMMERCIAL

## 2018-03-21 NOTE — PROGRESS NOTES
PT Discharge    Today's date: 3/21/2018  Patient name: Izabel Raphael  : 1965  MRN: [id number]  Referring provider: Kings Palacios  Dx:   Encounter Diagnosis     ICD-10-CM    1  Chronic bilateral low back pain with right-sided sciatica M54 41     G89 29        Start Time: 1030  Stop Time: 1145  Total time in clinic (min): 75 minutes    Assessment    Assessment details: PT notes decision to DC with HEP secondary to expiration of PT prescription  Plan  Plan details: DC from skilled therapy  Subjective Evaluation    History of Present Illness  Mechanism of injury: PT notes the patient with no further contact with clinic to update status or re-schedule appointments           Objective        Precautions:  None

## 2018-03-29 ENCOUNTER — TRANSCRIBE ORDERS (OUTPATIENT)
Dept: PHYSICAL THERAPY | Facility: CLINIC | Age: 53
End: 2018-03-29

## 2018-03-29 DIAGNOSIS — G89.29 CHRONIC BILATERAL LOW BACK PAIN WITH LEFT-SIDED SCIATICA: Primary | ICD-10-CM

## 2018-03-29 DIAGNOSIS — M54.42 CHRONIC BILATERAL LOW BACK PAIN WITH LEFT-SIDED SCIATICA: Primary | ICD-10-CM

## 2019-04-30 ENCOUNTER — APPOINTMENT (OUTPATIENT)
Dept: RADIOLOGY | Facility: MEDICAL CENTER | Age: 54
End: 2019-04-30
Payer: COMMERCIAL

## 2019-04-30 ENCOUNTER — OFFICE VISIT (OUTPATIENT)
Dept: OBGYN CLINIC | Facility: CLINIC | Age: 54
End: 2019-04-30
Payer: COMMERCIAL

## 2019-04-30 VITALS
DIASTOLIC BLOOD PRESSURE: 81 MMHG | SYSTOLIC BLOOD PRESSURE: 122 MMHG | HEIGHT: 70 IN | BODY MASS INDEX: 33.36 KG/M2 | HEART RATE: 73 BPM | WEIGHT: 233 LBS

## 2019-04-30 DIAGNOSIS — M25.561 ACUTE PAIN OF RIGHT KNEE: ICD-10-CM

## 2019-04-30 DIAGNOSIS — S83.241A OTHER TEAR OF MEDIAL MENISCUS, CURRENT INJURY, RIGHT KNEE, INITIAL ENCOUNTER: Primary | ICD-10-CM

## 2019-04-30 PROCEDURE — 73562 X-RAY EXAM OF KNEE 3: CPT

## 2019-04-30 PROCEDURE — 99203 OFFICE O/P NEW LOW 30 MIN: CPT | Performed by: ORTHOPAEDIC SURGERY

## 2019-04-30 RX ORDER — AMOXICILLIN 500 MG
1 CAPSULE ORAL DAILY
COMMUNITY

## 2020-04-07 ENCOUNTER — APPOINTMENT (OUTPATIENT)
Dept: LAB | Facility: MEDICAL CENTER | Age: 55
End: 2020-04-07
Payer: COMMERCIAL

## 2020-04-07 ENCOUNTER — TRANSCRIBE ORDERS (OUTPATIENT)
Dept: ADMINISTRATIVE | Facility: HOSPITAL | Age: 55
End: 2020-04-07

## 2020-04-07 DIAGNOSIS — R53.83 FATIGUE, UNSPECIFIED TYPE: ICD-10-CM

## 2020-04-07 DIAGNOSIS — R53.83 FATIGUE, UNSPECIFIED TYPE: Primary | ICD-10-CM

## 2020-04-07 DIAGNOSIS — R35.1 NOCTURIA: ICD-10-CM

## 2020-04-07 LAB
PSA SERPL-MCNC: 0.5 NG/ML (ref 0–4)
TESTOST SERPL-MCNC: 430 NG/DL (ref 95–948)

## 2020-04-07 PROCEDURE — 84153 ASSAY OF PSA TOTAL: CPT

## 2020-04-07 PROCEDURE — 84403 ASSAY OF TOTAL TESTOSTERONE: CPT

## 2020-04-07 PROCEDURE — 36415 COLL VENOUS BLD VENIPUNCTURE: CPT

## 2021-08-05 ENCOUNTER — HOSPITAL ENCOUNTER (OUTPATIENT)
Dept: NON INVASIVE DIAGNOSTICS | Facility: HOSPITAL | Age: 56
Discharge: HOME | End: 2021-08-05
Payer: COMMERCIAL

## 2021-08-05 DIAGNOSIS — R09.89 OTHER SPECIFIED SYMPTOMS AND SIGNS INVOLVING THE CIRCULATORY AND RESPIRATORY SYSTEMS: ICD-10-CM

## 2021-08-05 PROCEDURE — 93880 EXTRACRANIAL BILAT STUDY: CPT | Performed by: SURGERY

## 2021-08-05 PROCEDURE — 93880 EXTRACRANIAL BILAT STUDY: CPT

## 2023-01-18 ENCOUNTER — TELEPHONE (OUTPATIENT)
Dept: UROLOGY | Facility: AMBULATORY SURGERY CENTER | Age: 58
End: 2023-01-18

## 2023-01-18 NOTE — TELEPHONE ENCOUNTER
New Patient    What is the reason for the patient’s appointment? ED     What office location does the patient prefer? [place]     Imaging/Lab Results:    Do we accept the patient's insurance or is the patient Self-Pay? Yes     Insurance Provider: Maria Guadalupe Joshua Type/Number:  [id number]  Member ID#: [id number]    Has the patient had any previous Urologist(s)? No     Have patient records been requested? If not are records showing in [address] [place] [address] Rd: records in [facility]     Has the patient had any outside testing done? No     Does the patient have a personal history of cancer?  No

## 2023-01-30 ENCOUNTER — OFFICE VISIT (OUTPATIENT)
Dept: UROLOGY | Facility: CLINIC | Age: 58
End: 2023-01-30

## 2023-01-30 VITALS
HEIGHT: 71 IN | WEIGHT: 204 LBS | BODY MASS INDEX: 28.56 KG/M2 | DIASTOLIC BLOOD PRESSURE: 88 MMHG | SYSTOLIC BLOOD PRESSURE: 128 MMHG | HEART RATE: 76 BPM

## 2023-01-30 DIAGNOSIS — N52.9 ERECTILE DYSFUNCTION, UNSPECIFIED ERECTILE DYSFUNCTION TYPE: Primary | ICD-10-CM

## 2023-01-30 DIAGNOSIS — R53.83 OTHER FATIGUE: ICD-10-CM

## 2023-01-30 DIAGNOSIS — Z00.00 WELLNESS EXAMINATION: ICD-10-CM

## 2023-01-30 RX ORDER — SILDENAFIL CITRATE 20 MG/1
20 TABLET ORAL AS NEEDED
Qty: 10 TABLET | Refills: 3 | Status: SHIPPED | OUTPATIENT
Start: 2023-01-30

## 2023-01-30 RX ORDER — CHOLECALCIFEROL (VITAMIN D3) 1250 MCG
CAPSULE ORAL
COMMUNITY
Start: 2022-10-01

## 2023-01-30 NOTE — PROGRESS NOTES
1/30/2023    No chief complaint on file  Assessment and Plan    61 y o  male new patient to office    1  Erectile dysfunction  · Etiology is unclear as he has no history of diabetes, hypertension, or low testosterone  Performance anxiety does play a factor however this was not an issue in the past   · As he also reports issues with decreased sex drive as well as difficulty losing weight despite regular exercise and following a keto diet I recommend we check a testosterone level  He did have testosterone testing in 2020 for which it was normal at 430  I am also recommending a trial of sildenafil 20 mg as needed  He was instructed that he can increase the dose to a max dose of 100 mg as needed  He will follow-up in 4 weeks  History of Present Illness  Santiago Escamilla is a 61 y o  male new patient to office  Here for evaluation of erectile dysfunction  He reports over the past month he has been experiencing worsening issues with erectile dysfunction  He does reports issues with this intermittently over the past several years, however has become more bothersome over the past month  He denies every trying any oral PDE% inhibitors  He does reports making a significant life-style modifications about 2 years ago when he lost over 50 lbs and made changes to his diet  He continues to exercise regularly and is following a keto diet with intermittent fasting  He  is able to achieve an erection without difficulty however he is unable to maintain this for longer than several minutes and is unable to have sexual intercourse because of this  He also reports a decrease in his sex drive  On average he is sexually active about 1 time per week but this has significant reduced since his issues with ED  He denies any significant stress related to work, life, or marriage  He does reports some mild BPH symptoms of weaker urinary stream in the morning which improves throughout the day and denies any nocturia  Prostate cancer screening: Negative family history of prostate cancer  Current PSA 0 57 as of 7/20/2022  Review of Systems   Constitutional: Negative for chills and fever  HENT: Negative for congestion and sore throat  Respiratory: Negative for cough and shortness of breath  Cardiovascular: Negative for chest pain and leg swelling  Gastrointestinal: Negative for abdominal pain, constipation and diarrhea  Genitourinary: Negative for difficulty urinating, dysuria, frequency, hematuria and urgency  Erectile dysfunction, intermittent weak urinary stream   Musculoskeletal: Negative for back pain and gait problem  Skin: Negative for wound  Allergic/Immunologic: Negative for immunocompromised state  Hematological: Does not bruise/bleed easily  Psychiatric/Behavioral:        Low libido           AUA SYMPTOM SCORE    Flowsheet Row Most Recent Value   AUA SYMPTOM SCORE    How often have you had a sensation of not emptying your bladder completely after you finished urinating? 1 (P)     How often have you had to urinate again less than two hours after you finished urinating? 1 (P)     How often have you found you stopped and started again several times when you urinate? 1 (P)     How often have you found it difficult to postpone urination? 1 (P)     How often have you had a weak urinary stream? 4 (P)     How often have you had to push or strain to begin urination? 1 (P)     How many times did you most typically get up to urinate from the time you went to bed at night until the time you got up in the morning? 0 (P)     Quality of Life: If you were to spend the rest of your life with your urinary condition just the way it is now, how would you feel about that? 5 (P)     AUA SYMPTOM SCORE 9 (P)           Vitals  Vitals:    01/30/23 1347   BP: 128/88   Pulse: 76   Weight: 92 5 kg (204 lb)   Height: 5' 11" (1 803 m)       Physical Exam  Vitals reviewed     Constitutional:       General: He is not in acute distress  Appearance: Normal appearance  He is not ill-appearing or toxic-appearing  HENT:      Head: Normocephalic and atraumatic  Eyes:      General: No scleral icterus  Conjunctiva/sclera: Conjunctivae normal    Cardiovascular:      Rate and Rhythm: Normal rate  Pulmonary:      Effort: Pulmonary effort is normal  No respiratory distress  Abdominal:      Tenderness: There is no right CVA tenderness or left CVA tenderness  Hernia: No hernia is present  Musculoskeletal:      Cervical back: Normal range of motion  Right lower leg: No edema  Left lower leg: No edema  Skin:     General: Skin is warm and dry  Coloration: Skin is not jaundiced or pale  Neurological:      General: No focal deficit present  Mental Status: He is alert and oriented to person, place, and time  Mental status is at baseline  Gait: Gait normal    Psychiatric:         Mood and Affect: Mood normal          Behavior: Behavior normal          Thought Content:  Thought content normal          Judgment: Judgment normal          Past History  Past Medical History:   Diagnosis Date   • IBS (irritable bowel syndrome)      Social History     Socioeconomic History   • Marital status: /Civil Union     Spouse name: None   • Number of children: None   • Years of education: None   • Highest education level: None   Occupational History   • Occupation: Site    Tobacco Use   • Smoking status: Never   • Smokeless tobacco: Never   • Tobacco comments:     pt is a non-smoker   Substance and Sexual Activity   • Alcohol use: Yes     Comment: occasional social drink   • Drug use: No   • Sexual activity: Yes     Partners: Female     Birth control/protection: Abstinence   Other Topics Concern   • None   Social History Narrative   • None     Social Determinants of Health     Financial Resource Strain: Not on file   Food Insecurity: Not on file   Transportation Needs: Not on file   Physical Activity: Not on file   Stress: Not on file   Social Connections: Not on file   Intimate Partner Violence: Not on file   Housing Stability: Not on file     Social History     Tobacco Use   Smoking Status Never   Smokeless Tobacco Never   Tobacco Comments    pt is a non-smoker     Family History   Problem Relation Age of Onset   • Heart disease Mother    • Heart disease Father    • Liver cancer Father        The following portions of the patient's history were reviewed and updated as appropriate allergies, current medications, past medical history, past social history, past surgical history and problem list    Imaging:    Results  No results found for this or any previous visit (from the past 1 hour(s)) ]  Lab Results   Component Value Date    PSA 0 5 04/07/2020     Lab Results   Component Value Date    CALCIUM 9 0 12/02/2017    K 4 2 12/02/2017    CO2 29 12/02/2017     12/02/2017    BUN 11 12/02/2017    CREATININE 0 89 12/02/2017     Lab Results   Component Value Date    WBC 8 79 12/02/2017    HGB 15 2 12/02/2017    HCT 44 4 12/02/2017    MCV 88 12/02/2017     12/02/2017       Please Note:  Voice dictation software has been used to create this document  There may be inadvertent transcriptions errors       Hardy Edwards

## 2023-02-01 ENCOUNTER — LAB (OUTPATIENT)
Dept: LAB | Facility: MEDICAL CENTER | Age: 58
End: 2023-02-01

## 2023-02-01 DIAGNOSIS — N52.9 ERECTILE DYSFUNCTION, UNSPECIFIED ERECTILE DYSFUNCTION TYPE: ICD-10-CM

## 2023-02-01 DIAGNOSIS — R53.83 OTHER FATIGUE: ICD-10-CM

## 2023-02-02 LAB
TESTOST FREE SERPL-MCNC: 8.1 PG/ML (ref 7.2–24)
TESTOST SERPL-MCNC: 856 NG/DL (ref 264–916)

## 2023-02-28 ENCOUNTER — OFFICE VISIT (OUTPATIENT)
Dept: UROLOGY | Facility: CLINIC | Age: 58
End: 2023-02-28

## 2023-02-28 VITALS
HEIGHT: 71 IN | BODY MASS INDEX: 29.12 KG/M2 | WEIGHT: 208 LBS | DIASTOLIC BLOOD PRESSURE: 82 MMHG | SYSTOLIC BLOOD PRESSURE: 118 MMHG

## 2023-02-28 DIAGNOSIS — N52.9 ERECTILE DYSFUNCTION, UNSPECIFIED ERECTILE DYSFUNCTION TYPE: Primary | ICD-10-CM

## 2023-02-28 RX ORDER — SILDENAFIL 100 MG/1
100 TABLET, FILM COATED ORAL DAILY PRN
Qty: 10 TABLET | Refills: 3 | Status: SHIPPED | OUTPATIENT
Start: 2023-02-28

## 2023-02-28 NOTE — PROGRESS NOTES
2/28/2023    No chief complaint on file  Assessment and Plan    61 y o  male     1  Erectile dysfunction  · This is likely stress related as he has no other risk factors and his testosterone level was 856  · He would like to continue to try sildenafil rather then switching to tadalafil  I have sent a new prescription to his pharmacy for sildenafil 100 mg as needed  · Continue with stress reduction  · He will follow-up as needed  Subjective:      He presents today reporting that he has seen little improvement in his erectile dysfunction since trying sildenafil  He started with 20 mg as needed and reported no change and increased this to a total of 100 mg as needed and still reported little effects  He did tolerate sildenafil well and reports no adverse effects  History of Present Illness  Emeka Alston is a 61 y o  male here for follow-up evaluation of erectile dysfunction  He was initially seen by me on 1/30/2023 for evaluation of the above  He had reported over the past month experiencing worsening issues with erectile dysfunction  He did report issues with this intermittently over the past several years, however has become more bothersome over the past month  He denies every trying any oral PDE% inhibitors  He does reports making a significant life-style modifications about 2 years ago when he lost over 50 lbs and made changes to his diet  He continues to exercise regularly and is following a keto diet with intermittent fasting      He  is able to achieve an erection without difficulty however he is unable to maintain this for longer than several minutes and is unable to have sexual intercourse because of this  He also reports a decrease in his sex drive  On average he is sexually active about 1 time per week but this has significant reduced since his issues with ED  He denies any significant stress related to work, life, or marriage    He had testosterone testing in the past and was 430 in 2020  I had recommended a trial of sildenafil 20 mg as needed as well as checking a testosterone level to ensure this was not a contributing factor  This was completed on 2/1/2023 with a total testosterone level of 856  Review of Systems   Constitutional: Negative for chills and fever  HENT: Negative for congestion and sore throat  Respiratory: Negative for cough and shortness of breath  Cardiovascular: Negative for chest pain and leg swelling  Gastrointestinal: Negative for abdominal pain, constipation, diarrhea, nausea and vomiting  Genitourinary: Negative for difficulty urinating, dysuria, frequency, hematuria and urgency  ED   Musculoskeletal: Negative for back pain and gait problem  Skin: Negative for wound  Allergic/Immunologic: Negative for immunocompromised state  Neurological: Negative for dizziness, weakness and numbness  Hematological: Does not bruise/bleed easily  Vitals  Vitals:    02/28/23 1502   BP: 118/82   Weight: 94 3 kg (208 lb)   Height: 5' 11" (1 803 m)       Physical Exam  Vitals reviewed  Constitutional:       General: He is not in acute distress  Appearance: Normal appearance  He is not ill-appearing or toxic-appearing  HENT:      Head: Normocephalic and atraumatic  Eyes:      General: No scleral icterus  Conjunctiva/sclera: Conjunctivae normal    Cardiovascular:      Rate and Rhythm: Normal rate  Pulmonary:      Effort: Pulmonary effort is normal  No respiratory distress  Abdominal:      Palpations: Abdomen is soft  Tenderness: There is no abdominal tenderness  There is no right CVA tenderness or left CVA tenderness  Hernia: No hernia is present  Musculoskeletal:      Cervical back: Normal range of motion  Right lower leg: No edema  Left lower leg: No edema  Skin:     General: Skin is warm and dry  Coloration: Skin is not jaundiced or pale     Neurological:      General: No focal deficit present  Mental Status: He is alert and oriented to person, place, and time  Mental status is at baseline  Gait: Gait normal    Psychiatric:         Mood and Affect: Mood normal          Behavior: Behavior normal          Thought Content:  Thought content normal          Judgment: Judgment normal          Past History  Past Medical History:   Diagnosis Date   • IBS (irritable bowel syndrome)      Social History     Socioeconomic History   • Marital status: /Civil Union     Spouse name: None   • Number of children: None   • Years of education: None   • Highest education level: None   Occupational History   • Occupation: Site    Tobacco Use   • Smoking status: Never   • Smokeless tobacco: Never   • Tobacco comments:     pt is a non-smoker   Substance and Sexual Activity   • Alcohol use: Yes     Comment: occasional social drink   • Drug use: No   • Sexual activity: Yes     Partners: Female     Birth control/protection: Abstinence   Other Topics Concern   • None   Social History Narrative   • None     Social Determinants of Health     Financial Resource Strain: Not on file   Food Insecurity: Not on file   Transportation Needs: Not on file   Physical Activity: Not on file   Stress: Not on file   Social Connections: Not on file   Intimate Partner Violence: Not on file   Housing Stability: Not on file     Social History     Tobacco Use   Smoking Status Never   Smokeless Tobacco Never   Tobacco Comments    pt is a non-smoker     Family History   Problem Relation Age of Onset   • Heart disease Mother    • Heart disease Father    • Liver cancer Father        The following portions of the patient's history were reviewed and updated as appropriate allergies, current medications, past medical history, past social history, past surgical history and problem list    Imaging:    Results  No results found for this or any previous visit (from the past 1 hour(s)) ]  Lab Results   Component Value Date PSA 0 5 04/07/2020     Lab Results   Component Value Date    CALCIUM 9 0 12/02/2017    K 4 2 12/02/2017    CO2 29 12/02/2017     12/02/2017    BUN 11 12/02/2017    CREATININE 0 89 12/02/2017     Lab Results   Component Value Date    WBC 8 79 12/02/2017    HGB 15 2 12/02/2017    HCT 44 4 12/02/2017    MCV 88 12/02/2017     12/02/2017       Please Note:  Voice dictation software has been used to create this document  There may be inadvertent transcriptions errors       BROOK Moran 02/28/23

## 2023-06-01 ENCOUNTER — HOSPITAL ENCOUNTER (OUTPATIENT)
Dept: NON INVASIVE DIAGNOSTICS | Facility: CLINIC | Age: 58
Discharge: HOME | End: 2023-06-01

## 2023-06-01 DIAGNOSIS — R00.2 PALPITATIONS: ICD-10-CM

## 2023-06-02 ENCOUNTER — APPOINTMENT (OUTPATIENT)
Dept: LAB | Facility: MEDICAL CENTER | Age: 58
End: 2023-06-02
Payer: COMMERCIAL

## 2023-06-02 DIAGNOSIS — E78.2 MIXED HYPERLIPIDEMIA: ICD-10-CM

## 2023-06-02 LAB
CHOLEST SERPL-MCNC: 220 MG/DL
HDLC SERPL-MCNC: 60 MG/DL
LDLC SERPL CALC-MCNC: 151 MG/DL (ref 0–100)
NONHDLC SERPL-MCNC: 160 MG/DL
TRIGL SERPL-MCNC: 46 MG/DL

## 2023-06-02 PROCEDURE — 80061 LIPID PANEL: CPT

## 2023-06-02 PROCEDURE — 36415 COLL VENOUS BLD VENIPUNCTURE: CPT

## 2023-06-06 ENCOUNTER — OFFICE VISIT (OUTPATIENT)
Dept: CARDIOLOGY CLINIC | Facility: CLINIC | Age: 58
End: 2023-06-06
Payer: COMMERCIAL

## 2023-06-06 VITALS
SYSTOLIC BLOOD PRESSURE: 126 MMHG | HEART RATE: 82 BPM | DIASTOLIC BLOOD PRESSURE: 70 MMHG | OXYGEN SATURATION: 97 % | WEIGHT: 209 LBS | BODY MASS INDEX: 29.26 KG/M2 | HEIGHT: 71 IN | RESPIRATION RATE: 16 BRPM

## 2023-06-06 DIAGNOSIS — R94.31 ABNORMAL EKG: ICD-10-CM

## 2023-06-06 DIAGNOSIS — R00.2 INTERMITTENT PALPITATIONS: Primary | ICD-10-CM

## 2023-06-06 DIAGNOSIS — E78.5 HYPERLIPIDEMIA, UNSPECIFIED HYPERLIPIDEMIA TYPE: ICD-10-CM

## 2023-06-06 DIAGNOSIS — E66.3 OVERWEIGHT (BMI 25.0-29.9): ICD-10-CM

## 2023-06-06 PROCEDURE — 99204 OFFICE O/P NEW MOD 45 MIN: CPT | Performed by: INTERNAL MEDICINE

## 2023-06-06 PROCEDURE — 93000 ELECTROCARDIOGRAM COMPLETE: CPT | Performed by: INTERNAL MEDICINE

## 2023-06-06 NOTE — PROGRESS NOTES
Cardiology Consultation     Stefani Bustillos  [id number]  1965  [facility] [address] [address]      1  Intermittent palpitations        2  Abnormal EKG  POCT ECG      3  Overweight (BMI 25 0-29 9)        4  Hyperlipidemia, unspecified hyperlipidemia type            Discussion/Summary:  1  Abnormal EKG  2  Intermittent palpitations  3  Overweight  4  Hyperlipidemia      -EKG performed the office today shows sinus rhythm heart rate 66 bpm  -48-hour Holter monitor with predominantly sinus rhythm average heart rate 74 bpm with patient's symptoms of palpitations correlating with sinus rhythm heart rate anywhere from 59-97 bpm  -We will check transthoracic echocardiogram to evaluate overall cardiac structure and function  -In the setting of recent testing lipid profile still showing elevated LDL and patient wishing to avoid statin therapy if possible we will check coronary calcium score to assist in restratification  -Patient counseled on dietary and lifestyle modifications including following a low-salt, low-fat, heart healthy diet with continued physical activity  -Patient will be seen in 3 months or sooner if necessary once testing is completed  -Patient counseled if he were to have any warning or alarm type symptoms he is to seek emergency medical care immediately  History of Present Illness:  -Patient is a 79-year-old male with IBS, chronic low back pain documented and abnormal ECG along with bradycardia who presents to the office today after referral from his primary care physician for intermittent palpitations  Patient notes that these began a little over a month ago and were short-lived and after some dietary and lifestyle modifications had resolution of symptoms and overall feels well    He was previously seen back in 2016 at [facility] [place] [facility] for chest discomfort however has not had any significant recurrence of the symptoms and overall leads a fairly active lifestyle and has been trying to do better from a dietary standpoint as well  He does have a more plant-based keto-based diet and overall feels well at this time   -Currently in the office today denies any chest pain, palpitations, lightness or dizziness, loss conscious, shortness of breath, lower extremity edema, orthopnea or bendopnea  -Patient was recommended for statin therapy in the past but wished to trial dietary lifestyle modifications prior to any initiation of medical therapy   -He notes blood pressures at home are well controlled with no significant issues  -Patient denies any tobacco or illicit drug use and has intermittent social alcohol use  -Patient no significant family history of heart disease in father who had coronary artery bypass surgery at age early 62s, mother with heart disease with coronary bypass surgery and valve replacement at age early 62s        Patient Active Problem List   Diagnosis   • Chest pain   • Abnormal EKG   • Bradycardia   • Diarrhea   • Chronic bilateral low back pain with right-sided sciatica     Past Medical History:   Diagnosis Date   • IBS (irritable bowel syndrome)      Social History     Socioeconomic History   • Marital status: /Civil Union     Spouse name: Not on file   • Number of children: Not on file   • Years of education: Not on file   • Highest education level: Not on file   Occupational History   • Occupation: Site    Tobacco Use   • Smoking status: Never   • Smokeless tobacco: Never   • Tobacco comments:     pt is a non-smoker   Substance and Sexual Activity   • Alcohol use: Yes     Comment: occasional social drink   • Drug use: No   • Sexual activity: Yes     Partners: Female     Birth control/protection: Abstinence   Other Topics Concern   • Not on file   Social History Narrative   • Not on file     Social Determinants of Health     Financial Resource Strain: Not on file   Food Insecurity: Not on file   Transportation Needs: Not on file   Physical Activity: Not on file   Stress: Not on file   Social Connections: Not on file   Intimate Partner Violence: Not on file   Housing Stability: Not on file      Family History   Problem Relation Age of Onset   • Heart disease Mother    • Heart disease Father    • Liver cancer Father      Past Surgical History:   Procedure Laterality Date   • COLONOSCOPY  02/15/2019    1 polyp-tubular adenoma   • EGD  09/23/2016   • EGD  04/23/2015   • EGD  2010    antral adenoma-biopsies showed low-grade dysplasia, prepyloric lesion positive for intestinal metaplasia   • EGD  11/2011    no residual lesion noted   • EGD  07/2011    Gastric adenoma   • EGD  02/15/2019    Polyp antrum-hyperplastic biopsy of the stomach negative for H  pylori   • EGD  01/2014    Reactive changes and biopsy was intestinal metaplasia and fundic gland polyp   • FLEXIBLE SIGMOIDOSCOPY  08/01/2017    Biopsy negative for microscopic/lymphocytic colitis   • STOMACH SURGERY      polyp removal       Current Outpatient Medications:   •  Cholecalciferol (Vitamin D3) 1 25 MG (20035 UT) CAPS, , Disp: , Rfl:   •  Omega-3 Fatty Acids (FISH OIL) 1200 MG CAPS, Take 1 capsule by mouth daily, Disp: , Rfl:   •  sildenafil (VIAGRA) 100 mg tablet, Take 1 tablet (100 mg total) by mouth daily as needed for erectile dysfunction, Disp: 10 tablet, Rfl: 3  No Known Allergies      Labs:  Appointment on 06/02/2023   Component Date Value   • Cholesterol 06/02/2023 220 (H)    • Triglycerides 06/02/2023 46    • HDL, Direct 06/02/2023 60    • LDL Calculated 06/02/2023 151 (H)    • Non-HDL-Chol (CHOL-HDL) 06/02/2023 160         Imaging: No results found  Review of Systems:  Review of Systems   Constitutional: Negative for chills, diaphoresis, fatigue and fever  HENT: Negative for trouble swallowing and voice change      Respiratory: Negative for "shortness of breath and wheezing  Cardiovascular: Negative for chest pain, palpitations and leg swelling  Gastrointestinal: Negative for abdominal pain, constipation, diarrhea, nausea and vomiting  Genitourinary: Negative for dysuria  Musculoskeletal: Positive for arthralgias  Negative for neck pain and neck stiffness  Skin: Negative for rash  Neurological: Negative for dizziness, syncope, light-headedness and headaches  Psychiatric/Behavioral: Negative for agitation and confusion  All other systems reviewed and are negative  Vitals:    06/06/23 1451   BP: 126/70   BP Location: Left arm   Patient Position: Sitting   Cuff Size: Standard   Pulse: 82   Resp: 16   SpO2: 97%   Weight: 94 8 kg (209 lb)   Height: 5' 11\" (1 803 m)     Vitals:    06/06/23 1451   Weight: 94 8 kg (209 lb)     Height: 5' 11\" (180 3 cm)     Physical Exam:  Physical Exam  Vitals reviewed  Constitutional:       General: He is not in acute distress  Appearance: Normal appearance  He is not diaphoretic  HENT:      Head: Normocephalic and atraumatic  Eyes:      General:         Right eye: No discharge  Left eye: No discharge  Neck:      Comments: Trachea midline, no JVD present  Cardiovascular:      Rate and Rhythm: Normal rate and regular rhythm  Heart sounds: No friction rub  Pulmonary:      Effort: Pulmonary effort is normal  No respiratory distress  Breath sounds: No wheezing  Chest:      Chest wall: No tenderness  Abdominal:      General: Bowel sounds are normal       Palpations: Abdomen is soft  Tenderness: There is no abdominal tenderness  There is no rebound  Musculoskeletal:      Right lower leg: No edema  Left lower leg: No edema  Skin:     General: Skin is warm and dry  Neurological:      Mental Status: He is alert  Comments: Awake, alert, able to answer questions appropriately, able to move extremities bilaterally     Psychiatric:         Mood and Affect: " Mood normal          Behavior: Behavior normal

## 2023-06-28 ENCOUNTER — HOSPITAL ENCOUNTER (OUTPATIENT)
Dept: NON INVASIVE DIAGNOSTICS | Facility: CLINIC | Age: 58
Discharge: HOME | End: 2023-06-28
Payer: COMMERCIAL

## 2023-06-28 VITALS
HEART RATE: 80 BPM | SYSTOLIC BLOOD PRESSURE: 140 MMHG | BODY MASS INDEX: 29.26 KG/M2 | DIASTOLIC BLOOD PRESSURE: 76 MMHG | WEIGHT: 209 LBS | HEIGHT: 71 IN

## 2023-06-28 DIAGNOSIS — R00.2 INTERMITTENT PALPITATIONS: ICD-10-CM

## 2023-06-28 DIAGNOSIS — R94.31 ABNORMAL EKG: ICD-10-CM

## 2023-06-28 LAB
AORTIC ROOT: 3.8 CM
APICAL FOUR CHAMBER EJECTION FRACTION: 43 %
ASCENDING AORTA: 3.6 CM
AV LVOT MEAN GRADIENT: 3 MMHG
AV LVOT PEAK GRADIENT: 6 MMHG
DOP CALC LVOT PEAK VEL VTI: 26.46 CM
DOP CALC LVOT PEAK VEL: 1.22 M/S
E WAVE DECELERATION TIME: 267 MS
FRACTIONAL SHORTENING: 36 (ref 28–44)
INTERVENTRICULAR SEPTUM IN DIASTOLE (PARASTERNAL SHORT AXIS VIEW): 0.7 CM
INTERVENTRICULAR SEPTUM: 0.7 CM (ref 0.6–1.1)
LAAS-AP2: 14.2 CM2
LAAS-AP4: 11.8 CM2
LEFT ATRIUM SIZE: 3.4 CM
LEFT ATRIUM VOLUME (MOD BIPLANE): 32 ML
LEFT INTERNAL DIMENSION IN SYSTOLE: 2.7 CM (ref 2.1–4)
LEFT VENTRICULAR INTERNAL DIMENSION IN DIASTOLE: 4.2 CM (ref 3.5–6)
LEFT VENTRICULAR POSTERIOR WALL IN END DIASTOLE: 0.7 CM
LEFT VENTRICULAR STROKE VOLUME: 53 ML
LVSV (TEICH): 53 ML
MV E'TISSUE VEL-SEP: 8 CM/S
MV PEAK A VEL: 0.72 M/S
MV PEAK E VEL: 65 CM/S
MV STENOSIS PRESSURE HALF TIME: 77 MS
MV VALVE AREA P 1/2 METHOD: 2.86
RIGHT ATRIUM AREA SYSTOLE A4C: 11.8 CM2
RIGHT VENTRICLE ID DIMENSION: 2.9 CM
SL CV LEFT ATRIUM LENGTH A2C: 4.9 CM
SL CV LV EF: 67
SL CV PED ECHO LEFT VENTRICLE DIASTOLIC VOLUME (MOD BIPLANE) 2D: 80 ML
SL CV PED ECHO LEFT VENTRICLE SYSTOLIC VOLUME (MOD BIPLANE) 2D: 26 ML
TR MAX PG: 24 MMHG
TR PEAK VELOCITY: 2.5 M/S
TRICUSPID ANNULAR PLANE SYSTOLIC EXCURSION: 2.1 CM
TRICUSPID VALVE PEAK REGURGITATION VELOCITY: 2.47 M/S

## 2023-06-28 PROCEDURE — 93306 TTE W/DOPPLER COMPLETE: CPT

## 2023-06-28 PROCEDURE — 93306 TTE W/DOPPLER COMPLETE: CPT | Performed by: INTERNAL MEDICINE

## 2023-07-06 ENCOUNTER — HOSPITAL ENCOUNTER (OUTPATIENT)
Dept: RADIOLOGY | Facility: HOSPITAL | Age: 58
Discharge: HOME | End: 2023-07-06
Attending: INTERNAL MEDICINE
Payer: COMMERCIAL

## 2023-07-06 DIAGNOSIS — E66.3 OVERWEIGHT (BMI 25.0-29.9): ICD-10-CM

## 2023-07-06 DIAGNOSIS — E78.5 HYPERLIPIDEMIA, UNSPECIFIED HYPERLIPIDEMIA TYPE: ICD-10-CM

## 2023-07-06 PROCEDURE — G1004 CDSM NDSC: HCPCS

## 2023-07-06 PROCEDURE — 75571 CT HRT W/O DYE W/CA TEST: CPT

## 2023-07-10 ENCOUNTER — TELEPHONE (OUTPATIENT)
Dept: CARDIOLOGY CLINIC | Facility: CLINIC | Age: 58
End: 2023-07-10

## 2023-07-10 NOTE — TELEPHONE ENCOUNTER
I was able to call and speak with patient about recent coronary calcium score results. I informed the patient that based on his ASCVD risk I still recommend initiation of statin therapy likely beginning with atorvastatin 10 mg daily. Patient noted understanding at this time wishes to think about this recommendation further and make decision. He will let me know by next office visit what he decides. Patient notes overall doing well at this time. Patient was counseled on dietary and lifestyle modifications.

## 2023-08-22 ENCOUNTER — OFFICE VISIT (OUTPATIENT)
Dept: CARDIOLOGY CLINIC | Facility: CLINIC | Age: 58
End: 2023-08-22
Payer: COMMERCIAL

## 2023-08-22 VITALS
HEART RATE: 58 BPM | OXYGEN SATURATION: 96 % | BODY MASS INDEX: 27.66 KG/M2 | HEIGHT: 71 IN | RESPIRATION RATE: 14 BRPM | WEIGHT: 197.6 LBS | DIASTOLIC BLOOD PRESSURE: 86 MMHG | SYSTOLIC BLOOD PRESSURE: 132 MMHG

## 2023-08-22 DIAGNOSIS — R94.31 ABNORMAL ECG: ICD-10-CM

## 2023-08-22 DIAGNOSIS — R00.2 INTERMITTENT PALPITATIONS: ICD-10-CM

## 2023-08-22 DIAGNOSIS — E78.5 HYPERLIPIDEMIA, UNSPECIFIED HYPERLIPIDEMIA TYPE: Primary | ICD-10-CM

## 2023-08-22 DIAGNOSIS — E66.3 OVERWEIGHT (BMI 25.0-29.9): ICD-10-CM

## 2023-08-22 PROCEDURE — 99214 OFFICE O/P EST MOD 30 MIN: CPT | Performed by: INTERNAL MEDICINE

## 2023-08-22 RX ORDER — DIPHENOXYLATE HYDROCHLORIDE AND ATROPINE SULFATE 2.5; .025 MG/1; MG/1
1 TABLET ORAL DAILY
COMMUNITY

## 2023-08-22 RX ORDER — ATORVASTATIN CALCIUM 10 MG/1
10 TABLET, FILM COATED ORAL DAILY
Qty: 30 TABLET | Refills: 8 | Status: SHIPPED | OUTPATIENT
Start: 2023-08-22

## 2023-08-22 NOTE — PROGRESS NOTES
Cardiology Follow Up    Adelaida Irene  [id number]  1965   [facility] [place]  [address] [facility] [address]      1. Hyperlipidemia, unspecified hyperlipidemia type  atorvastatin (LIPITOR) 10 mg tablet    Lipid Panel with Direct LDL reflex    Comprehensive metabolic panel      2. Intermittent palpitations        3. Abnormal ECG        4. Overweight (BMI 25.0-29. 9)            Discussion/Summary:  1. Hyperlipidemia  2. Overweight  3. Intermittent palpitations-improved  4.   Abnormal ECG    -Transthoracic echocardiogram 6/20/2023 showing left-ventricular systolic function normal estimated LVEF 65% with normal diastolic parameters for age, normal right ventricular size and systolic function with ascending aorta measuring 3.8 cm in diameter.  -Coronary calcium score 7/6/2023 showing total calcium score 193 with mild calcification of the aortic valve as well  -In that setting along with recent lipid panel showing  and after discussion with patient he is agreeable to trialing low-dose atorvastatin 10 mg daily  -We will continue to monitor patient clinically and will recheck fasting lipid panel in 6 months prior to next office visit  -Patient will continue aggressive dietary and lifestyle modifications including following a low-salt, low-fat, heart healthy diet with continued physical activity and monitoring for symptoms  -Patient will let our office know if there are any concerning symptoms  -Patient counseled if he were to have any warning or alarm type symptoms to seek emergency medical care immediately      History of Present Illness:  -Patient is a 59-year-old male with IBS, chronic low back pain documented along with abnormal ECG and bradycardia who originally presented to the office in June 2023 after referral from his primary care physician for intermittent palpitations. Patient had been previously seen back in 2016 at [address] [place] [address] for some chest discomfort however had not had any significant recurrence of symptoms and has been leading a fairly active lifestyle since.  -Currently in the office today denies any chest pain, palpitations, lightness or dizziness, loss conscious, shortness of breath, lower extreme edema, orthopnea or bendopnea.     Patient Active Problem List   Diagnosis   • Chest pain   • Abnormal EKG   • Bradycardia   • Diarrhea   • Chronic bilateral low back pain with right-sided sciatica     Past Medical History:   Diagnosis Date   • IBS (irritable bowel syndrome)      Social History     Socioeconomic History   • Marital status: /Civil Union     Spouse name: Not on file   • Number of children: Not on file   • Years of education: Not on file   • Highest education level: Not on file   Occupational History   • Occupation: Site    Tobacco Use   • Smoking status: Never   • Smokeless tobacco: Never   • Tobacco comments:     pt is a non-smoker   Substance and Sexual Activity   • Alcohol use: Yes     Comment: occasional social drink   • Drug use: No   • Sexual activity: Yes     Partners: Female     Birth control/protection: Abstinence   Other Topics Concern   • Not on file   Social History Narrative   • Not on file     Social Determinants of Health     Financial Resource Strain: Not on file   Food Insecurity: Not on file   Transportation Needs: Not on file   Physical Activity: Not on file   Stress: Not on file   Social Connections: Not on file   Intimate Partner Violence: Not on file   Housing Stability: Not on file      Family History   Problem Relation Age of Onset   • Heart disease Mother    • Heart disease Father    • Liver cancer Father      Past Surgical History:   Procedure Laterality Date   • COLONOSCOPY  02/15/2019    1 polyp-tubular adenoma   • EGD  09/23/2016   • EGD  04/23/2015   • EGD  2010    antral adenoma-biopsies showed low-grade dysplasia, prepyloric lesion positive for intestinal metaplasia   • EGD  11/2011    no residual lesion noted   • EGD  07/2011    Gastric adenoma   • EGD  02/15/2019    Polyp antrum-hyperplastic biopsy of the stomach negative for H. pylori   • EGD  01/2014    Reactive changes and biopsy was intestinal metaplasia and fundic gland polyp   • FLEXIBLE SIGMOIDOSCOPY  08/01/2017    Biopsy negative for microscopic/lymphocytic colitis   • STOMACH SURGERY      polyp removal       Current Outpatient Medications:   •  Cholecalciferol (Vitamin D3) 1.25 MG (16160 UT) CAPS, , Disp: , Rfl:   •  multivitamin (THERAGRAN) TABS, Take 1 tablet by mouth daily, Disp: , Rfl:   •  Omega-3 Fatty Acids (FISH OIL) 1200 MG CAPS, Take 1 capsule by mouth daily, Disp: , Rfl:   •  sildenafil (VIAGRA) 100 mg tablet, Take 1 tablet (100 mg total) by mouth daily as needed for erectile dysfunction, Disp: 10 tablet, Rfl: 3  •  atorvastatin (LIPITOR) 10 mg tablet, Take 1 tablet (10 mg total) by mouth daily, Disp: 30 tablet, Rfl: 8  No Known Allergies      Labs:  Hospital Outpatient Visit on 06/28/2023   Component Date Value   • A4C EF 06/28/2023 43    • LVIDd 06/28/2023 4.20    • LVIDS 06/28/2023 2.70    • IVSd 06/28/2023 0.70    • LVPWd 06/28/2023 0.70    • FS 06/28/2023 36    • MV E' Tissue Velocity Se* 06/28/2023 8    • E wave deceleration time 06/28/2023 267    • MV Peak E Jl 06/28/2023 65    • MV Peak A Jl 06/28/2023 0.72    • AV LVOT peak gradient 06/28/2023 6    • LVOT peak VTI 06/28/2023 26.46    • LVOT peak jl 06/28/2023 1.22    • RVID d 06/28/2023 2.9    • Tricuspid annular plane * 06/28/2023 2.10    • LA size 06/28/2023 3.4    • LA length (A2C) 06/28/2023 4.90    • LA volume (BP) 06/28/2023 32    • RAA A4C 06/28/2023 11.8    • LVOT mn grad 06/28/2023 3.0    • MV stenosis pressure 1/2* 06/28/2023 77    • MV valve area p 1/2 meth* 06/28/2023 2.86    • TR Peak Jl 06/28/2023 2.5    • Triscuspid Valve Regurgi* 06/28/2023 24.0    • Ao root 06/28/2023 3.80    • Asc Ao 06/28/2023 3.6    • Tricuspid valve peak reg* 06/28/2023 2.47    • Left ventricular stroke * 06/28/2023 53.00    • IVS 06/28/2023 0.7    • LEFT VENTRICLE SYSTOLIC * 43/33/1019 26    • LV DIASTOLIC VOLUME (MOD* 93/90/9552 80    • Left Atrium Area-systoli* 06/28/2023 11.8    • Left Atrium Area-systoli* 06/28/2023 14.2    • LVSV, 2D 06/28/2023 53    • LV EF 06/28/2023 67         Imaging: No results found. Review of Systems:  Review of Systems   Constitutional: Negative for chills, diaphoresis, fatigue and fever. HENT: Negative for trouble swallowing and voice change. Eyes: Negative for pain and redness. Respiratory: Negative for shortness of breath and wheezing. Cardiovascular: Negative for chest pain, palpitations and leg swelling. Gastrointestinal: Negative for abdominal pain, constipation, diarrhea, nausea and vomiting. Genitourinary: Negative for dysuria. Musculoskeletal: Positive for arthralgias. Negative for neck pain and neck stiffness. Neurological: Negative for dizziness, syncope, light-headedness and headaches. Psychiatric/Behavioral: Negative for agitation and confusion. All other systems reviewed and are negative. Vitals:    08/22/23 1531   BP: 132/86   BP Location: Left arm   Patient Position: Sitting   Cuff Size: Standard   Pulse: 58   Resp: 14   SpO2: 96%   Weight: 89.6 kg (197 lb 9.6 oz)   Height: 5' 11" (1.803 m)     Vitals:    08/22/23 1531   Weight: 89.6 kg (197 lb 9.6 oz)     Height: 5' 11" (180.3 cm)     Physical Exam:  Physical Exam  Vitals reviewed. Constitutional:       General: He is not in acute distress. Appearance: Normal appearance. He is not diaphoretic. HENT:      Head: Normocephalic and atraumatic. Eyes:      General:         Right eye: No discharge. Left eye: No discharge. Neck:      Comments: Trachea midline, no JVD present  Cardiovascular:      Rate and Rhythm: Normal rate and regular rhythm. Heart sounds: No friction rub. Pulmonary:      Effort: Pulmonary effort is normal. No respiratory distress. Breath sounds: No wheezing. Chest:      Chest wall: No tenderness. Abdominal:      General: Bowel sounds are normal.      Palpations: Abdomen is soft. Tenderness: There is no abdominal tenderness. There is no rebound. Musculoskeletal:      Right lower leg: No edema. Left lower leg: No edema. Skin:     General: Skin is warm and dry. Neurological:      Mental Status: He is alert. Comments: Awake, alert, able to answer questions appropriately, able to move extremities bilaterally.    Psychiatric:         Mood and Affect: Mood normal.         Behavior: Behavior normal.

## 2023-12-08 ENCOUNTER — HOSPITAL ENCOUNTER (OUTPATIENT)
Dept: RADIOLOGY | Facility: HOSPITAL | Age: 58
End: 2023-12-08
Payer: COMMERCIAL

## 2023-12-08 DIAGNOSIS — M54.2 CERVICALGIA: ICD-10-CM

## 2023-12-08 PROCEDURE — 72050 X-RAY EXAM NECK SPINE 4/5VWS: CPT

## 2024-01-11 ENCOUNTER — OFFICE VISIT (OUTPATIENT)
Dept: OBGYN CLINIC | Facility: CLINIC | Age: 59
End: 2024-01-11
Payer: COMMERCIAL

## 2024-01-11 VITALS
HEART RATE: 75 BPM | WEIGHT: 210 LBS | HEIGHT: 71 IN | DIASTOLIC BLOOD PRESSURE: 88 MMHG | BODY MASS INDEX: 29.4 KG/M2 | SYSTOLIC BLOOD PRESSURE: 145 MMHG | TEMPERATURE: 97.9 F

## 2024-01-11 DIAGNOSIS — M54.12 CERVICAL RADICULOPATHY: ICD-10-CM

## 2024-01-11 DIAGNOSIS — M54.2 CERVICALGIA: Primary | ICD-10-CM

## 2024-01-11 PROCEDURE — 99204 OFFICE O/P NEW MOD 45 MIN: CPT | Performed by: FAMILY MEDICINE

## 2024-01-11 NOTE — PROGRESS NOTES
"[facility] [address] [address] [phone]      Assessment:  1. Cervicalgia  -     Ambulatory Referral to Physical Therapy; Future    2. Cervical radiculopathy  -     Ambulatory Referral to Physical Therapy; Future        Plan:  Patient Instructions   F/u 6 wks  Begin physical therapy  Home exercises.  Icing/heat/OTC pain meds as needed.     Return in about 6 weeks (around 2/22/2024) for Recheck.    Chief Complaint:  Chief Complaint   Patient presents with    Neck - Pain       Subjective:   HPI    Patient ID: Ever Mesa is a 58 y.o. male     Here c/o neck pain  Went to chiropractor-  XR done  Pain started in sept- tweaked neck on roller coaster  Still having pain  Leaning to R/L/twisting/flex/ext  Using moist heat- helps  No pain meds  Better in the am  Prolonged sitting on couch  \"Just pain\"  Mild radiation down L shoulder.  Denies n/t    Narrative & Impression         CERVICAL SPINE     INDICATION:   Cervicalgia.      COMPARISON: Comparison made with previous examination(s) dated (CT) 06-Jul-2023.      VIEWS:  XR SPINE CERVICAL COMPLETE 4 OR 5 VW NON INJURY   Images: 6     FINDINGS:     No fracture.      Normal alignment without subluxation.     The intervertebral disc spaces are preserved.      The neuroforamina are patent.     The prevertebral soft tissues are within normal limits.       The lung apices are clear.     IMPRESSION:        Unremarkable cervical spine.     Electronically signed: 12/08/2023 07:57 PM Eduardo Hooper MD         Review of Systems   Constitutional:  Negative for fatigue and fever.   Respiratory:  Negative for shortness of breath.    Cardiovascular:  Negative for chest pain.   Gastrointestinal:  Negative for abdominal pain and nausea.   Genitourinary:  Negative for dysuria.   Musculoskeletal:  Positive for neck pain.   Skin:  Negative for rash and wound.   Neurological:  Negative for weakness and " "headaches.       Objective:  Vitals:  /88 (BP Location: Left arm, Patient Position: Sitting, Cuff Size: Standard)   Pulse 75   Temp 97.9 °F (36.6 °C) (Tympanic)   Ht 5' 11\" (1.803 m)   Wt 95.3 kg (210 lb)   BMI 29.29 kg/m²     The following portions of the patient's history were reviewed and updated as appropriate: allergies, current medications, past family history, past medical history, past social history, past surgical history, and problem list.    Physical exam:  Physical Exam  Constitutional:       Appearance: Normal appearance. He is normal weight.   Eyes:      Extraocular Movements: Extraocular movements intact.   Pulmonary:      Effort: Pulmonary effort is normal.   Musculoskeletal:      Cervical back: Normal range of motion.   Skin:     General: Skin is warm and dry.   Neurological:      General: No focal deficit present.      Mental Status: He is alert and oriented to person, place, and time. Mental status is at baseline.   Psychiatric:         Mood and Affect: Mood normal.         Behavior: Behavior normal.         Thought Content: Thought content normal.         Judgment: Judgment normal.       Back Exam     Tenderness   The patient is experiencing no tenderness.     Range of Motion   Extension:  normal   Flexion:  normal   Lateral bend right:  abnormal   Lateral bend left:  abnormal   Rotation right:  normal   Rotation left:  normal     Comments:  Pain with ROM  Pos spurling            I have personally reviewed pertinent films in PACS and my interpretation is XR-  C spine- nml study. No fx.      Jose Cruz MD   "

## 2024-01-18 ENCOUNTER — EVALUATION (OUTPATIENT)
Dept: PHYSICAL THERAPY | Facility: CLINIC | Age: 59
End: 2024-01-18
Payer: COMMERCIAL

## 2024-01-18 DIAGNOSIS — M54.2 CERVICALGIA: Primary | ICD-10-CM

## 2024-01-18 DIAGNOSIS — M54.12 CERVICAL RADICULOPATHY: ICD-10-CM

## 2024-01-18 PROCEDURE — 97161 PT EVAL LOW COMPLEX 20 MIN: CPT | Performed by: PHYSICAL MEDICINE & REHABILITATION

## 2024-01-18 PROCEDURE — 97110 THERAPEUTIC EXERCISES: CPT | Performed by: PHYSICAL MEDICINE & REHABILITATION

## 2024-01-18 PROCEDURE — 97530 THERAPEUTIC ACTIVITIES: CPT | Performed by: PHYSICAL MEDICINE & REHABILITATION

## 2024-01-18 NOTE — PROGRESS NOTES
PT Evaluation     Today's date: 2024  Patient name: Ever Mesa  : 1965  MRN: [id number]  Referring provider: Jose Cruz MD  Dx:   Encounter Diagnosis     ICD-10-CM    1. Cervicalgia  M54.2 Ambulatory Referral to Physical Therapy      2. Cervical radiculopathy  M54.12 Ambulatory Referral to Physical Therapy                     Assessment  Assessment details: Ever Mesa is a 58 y.o. male who was referred to physical therapy for management of ongoing L sided neck pain which appears 2* secondary to chronic mm stress/strain and postural dysfunction. NO red flags noted at this time. No radicular pain/sx or signs/sx of neural tension.  Primary impairments include limited neck ROM with pain, poor postural awareness, reduced DNF and bri scapular mm strength/endurance, inflexibility L UT, and reduced sitting tolerance.  Consequently, patient has difficulty completing ADLs including sitting, doing computer work, reading, turning head to drive etc.  Ever would benefit from skilled intervention to address all deficits and improve functional capability.  Patient is a good candidate for therapy, pending compliance with HEP and consistent participation in physical therapy.  Thank you for the referral and please do not hesitate to contact me with any questions or concerns regarding Ever's care!      Plan  Frequency:1-2x/week   Duration in weeks: 4-6 weeks   POC start date: 2024  POC end date:   Therapeutic exercise/activity, neuromuscular reeducation, manual therapy, and modalities.   Patient understands and agrees to plan of care.    Goals  Short Term--4 weeks  1. Patient will demonstrate 2 point decrease in pain levels.  2. Patient will demonstrate 1/2 point increase in all deficient MMT scores.  3. Pt will demonstrate WNL neck lateral flex and rotation B without pain/sx.     Long Term--By Discharge  1. Patient will achieve expected FOTO score.  2. Pt will be I with HEP.   3. Pt will  "tolerate full work day without pain/sx.     Patient's Goal: get rid of pain/sx           Symptom irritability: lowUnderstanding of Dx/Px/POC: good   Prognosis: fair    Plan  Patient would benefit from: skilled physical therapy  Treatment plan discussed with: patient        Subjective Evaluation    History of Present Illness  Mechanism of injury: Per most recent Ortho note: \"  [facility] [address] [address] [phone]        Assessment:  1. Cervicalgia  -     Ambulatory Referral to Physical Therapy; Future     2. Cervical radiculopathy  -     Ambulatory Referral to Physical Therapy; Future           Plan:    Patient Instructions  F/u 6 wks  Begin physical therapy  Home exercises.  Icing/heat/OTC pain meds as needed.     Return in about 6 weeks (around 2/22/2024) for Recheck.     Chief Complaint:    Chief Complaint  Patient presents with  · Neck - Pain        Subjective:   HPI     Patient ID: Ever Mesa is a 58 y.o. male      Here c/o neck pain  Went to chiropractor-  XR done  Pain started in sept- tweaked neck on roller coaster  Still having pain  Leaning to R/L/twisting/flex/ext  Using moist heat- helps  No pain meds  Better in the am  Prolonged sitting on couch  \"Just pain\"  Mild radiation down L shoulder.  Denies n/t     Plan:  Patient Instructions  F/u 6 wks  Begin physical therapy  Home exercises.  Icing/heat/OTC pain meds as needed.     Return in about 6 weeks (around 2/22/2024) for Recheck.\"    PT IE 1/18/24:  Onset of present sx in Sept 2023; notes he was riding a roller coaster sometime before this in August; may have gotten whip lash. Overall notes his sx have improved since, however as of late sx are staying the same. Doing stretches at home, using moist heat, and occasional OTC mediations; gives some relief. Did see the chirpractor a few times for adjustments of his neck and traction; notes no benefit /change in " "symptoms. No other tx to date. F/u with Dr. Cruz again end of Feb; referred to OPPT at this time.   Pt notes present sx are on L side only. L occipital region to L shoulder in region of L UT. No sx down the back or L arm/UE. Notes stretching L side is painful and very tight with stretching; notes if he \"pushes it\" it can really hurt when he brings his head back to neutral. No \"unusual n/t\" per pt; chronic hx of intermittent n/t R/L hand at night; no change per pt. Denies weakness L/R UE. Gets massages 1x/month; does help his neck. NO Ue or hand /arm weakness. Does not hx of prior neck injury many years ago however not related to present pain/sx. No HA. Notes no new  light headedness; does get light headed if he stand up too fast; not new or worse per pt. Does have tinitus in 'his head\"/B; started a few weeks ago; is f/u with MD for it tomorrow. No vision change. NO imbalance. No weakness. No speech difficulty /no swallowing difficulty. No facial sx. No syncope etc.   Sx are worse with lateral flexion and rotation of neck. Pain with sitting too long. 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 Sx are better with changing position, heat, stretching etc. Sits a lot for work; trying to change his desk set up which helps. No new sx/complaints overall.     Quality of life: good    Patient Goals  Patient goals for therapy: decreased pain and increased motion    Pain  Current pain rating: 3  At best pain ratin  At worst pain ratin (turning or moving head too far)  Location: L UT region  Quality: dull ache, sharp and tight  Relieving factors: heat, change in position, rest and support  Aggravating factors: sitting (turning head, side bending)  Progression: no change (as of late, but is slowly getting better)    Social Support    Employment status: working (desk/computer)  Hand dominance: right      Diagnostic Tests  X-ray: normal  Treatments  Previous treatment: massage, chiropractic and home therapy " (exercise/stretch at home)  Current treatment: physical therapy          Cervical AROM seated   Flex. 55*   Extn. 60*   SB Left 45* pain L side coming nack to neutral   SB Right 40* tight and pain on L side   ROT Left 70* tight on L side    ROT Right 85*    Repetitive testing: retraction= NT    Deep neck flexor endurance: poor    Shoulder ROM: WNL/WFL B shoulders without pain/sx with functional AROM              MMT    Shoulder       R       L   Flex. 4-4+ 4-4+   Extn.     Abd. 4-4+ 4-4+   IR. 4-4+ 4-4+   ER. 4 4   Behind back IR          Low Trap 4- 4-   Mid Trap 4 4                MMT    Elbow       R       L   Flex. 4+ 4+   Extn. 4+ 4+              MMT    Wrist       R         L   Flex. 4-4+ 4-4+   Extn. 4-4+ 4-4+    bent      arm straight     Digit ABD 4  4-  Thumb ext  4  4-      Head positioning:   Forward head/rounded shoulders- moderate   Increased thoracic kyphosis - mild-moderate   B scapular depression and protraction with mild winging -moderate   PPT -moderate     Palpation:    No ttp c-spine/t-spine  Increased mm tension noted mid portion L UT and L levator scap mms     Sensation (light touch L/R):   intact /symmetrical t/o B Ues   Reflexes (L/R):     C5:  WNL B       C6:    WNL B      C7:   WNL B , symmetrical reflexes B   Grey’s sign=       Negative B           scapula reflex= NT        ULTT’s:  NT no radicular sx       Vertebral artery test=  TBA sharp kishor test=   TBA supine transverse ligament=  TBA Alar ligament= TBA  Compression=  TBA  Spurling’s= TBA        Lateral flexion/rotation test = TBA        Cervical joint mobility: TBA    Thoracic mobility: TBA                  Precautions: IBS, chronic back pain       Re-eval Date:  2/29    Date 1/18/2024        Visit Count 1       FOTO 1/18/2024        Pain In See IE       Pain Out See IE           Manuals 1/18/2024        STM/DTM L UT, suboccipitals, c-spine PVMs, levator scap; consider GIASM         Contract relax stretch L UT            "              Neuro Re-Ed        DNF training         Ita scapular mm training                                                 Ther Ex        UBE alt - L shoulder/scapular mm conditioning/endurance         L UT stretch        L levator scap stretch  Reviewed 4x30\"       Reviewed 4x30\"        Pec stretch         Chin tucks           B ER         MTP/LTP         DNF endurance         Prone I/T/Ys        Scapular 90/90 at wall, endurance hold        Scap punches           Ther Activity 10'total   pt education regarding pathophysiology/pathoanatomy of present pain/sx and condition, role of PT in improving pain/sx and function, pt education regarding activity modification to avoid exacerbation of sx and delayed recovery;  Specific pt education regarding upright static seated posture , ergonomic desk set up for work, taking breaks from long periods of static sitting, adjusting neck/head position sitting to reduce neck strain, etc                          Gait Training                        Modalities                              1/18/2024  - HEP was issued and reviewed this date for above noted exercises. Pt demonstrated understanding without incident and without questions/concerns. Will continue to update upcoming.          "

## 2024-01-23 ENCOUNTER — OFFICE VISIT (OUTPATIENT)
Dept: PHYSICAL THERAPY | Facility: CLINIC | Age: 59
End: 2024-01-23
Payer: COMMERCIAL

## 2024-01-23 DIAGNOSIS — M54.12 CERVICAL RADICULOPATHY: ICD-10-CM

## 2024-01-23 DIAGNOSIS — M54.2 CERVICALGIA: Primary | ICD-10-CM

## 2024-01-23 PROCEDURE — 97110 THERAPEUTIC EXERCISES: CPT | Performed by: PHYSICAL MEDICINE & REHABILITATION

## 2024-01-23 PROCEDURE — 97140 MANUAL THERAPY 1/> REGIONS: CPT | Performed by: PHYSICAL MEDICINE & REHABILITATION

## 2024-01-23 NOTE — PROGRESS NOTES
"Daily Note     Today's date: 2024  Patient name: Ever Mesa  : 1965  MRN: [id number]  Referring provider: Jose Cruz MD  Dx:   Encounter Diagnosis     ICD-10-CM    1. Cervicalgia  M54.2       2. Cervical radiculopathy  M54.12                      Subjective: Pt notes he tweaked his neck a little yesterday; better now today; thinks its from the first day being back sitting at the desk long periods again per pt. Just a little sore today L UT region \" as usual\";\"tight\" in the mm; no new sx/complaints. Compliant with HEP. Seems to be muscular per pt.       Objective: See treatment diary below      Assessment: Tolerated treatment well. Initiated PT without incident. Pt with increased mm tension L UT in mid mm belly; area of restriction noted in mid mm belly with STM and GIASTM. Pt noted reduced mm tension after manual treatments. Responded well to contract relax L UT. Initiated postural corrective exercises; good technique noted with appropriate mm fatigue only. Issued B ER and red tband for HEP. No complaints after session. Reviewed seated postural awareness at home/desk. Patient demonstrated fatigue post treatment and would benefit from continued PT      Plan: Continue per plan of care.  Progress treatment as tolerated.       Precautions: IBS, chronic back pain       Re-eval Date:      Date 2024      Visit Count 1 2      FOTO 2024        Pain In See IE 4/10      Pain Out See IE 3/10 \"looser\"          Manuals 2024      STM/DTM L UT, suboccipitals, c-spine PVMs, levator scap; consider GIASM   STM and GIASTM instrument #4 sweeping/fanning L UT, levator scap, L c-spine PVMs pt seated to pt tolerance       Contract relax stretch L UT   3-4 trials 5-10\" with sustained end range stretch as andria pt supine         15' total               Neuro Re-Ed        DNF training         Ita scapular mm training                                                 Ther Ex        UBE alt - " "L shoulder/scapular mm conditioning/endurance   80 rpm alt   10' total       L UT stretch        L levator scap stretch  Reviewed 4x30\"       Reviewed 4x30\"  3x30\" ea      3x30\" ea       Pec stretch         Chin tucks           B ER         MTP/LTP   Red 2x10//5\" cues/feedback      DNF endurance         Prone I/T/Ys        Scapular 90/90 at wall, endurance hold        Scap punches           Ther Activity 10'total   pt education regarding pathophysiology/pathoanatomy of present pain/sx and condition, role of PT in improving pain/sx and function, pt education regarding activity modification to avoid exacerbation of sx and delayed recovery;  Specific pt education regarding upright static seated posture , ergonomic desk set up for work, taking breaks from long periods of static sitting, adjusting neck/head position sitting to reduce neck strain, etc    Reviewed seated postural awareness at home/desk/computer etc                       Gait Training                        Modalities                              1/18/2024  - HEP was issued and reviewed this date for above noted exercises. Pt demonstrated understanding without incident and without questions/concerns. Will continue to update upcoming.          "

## 2024-01-25 ENCOUNTER — OFFICE VISIT (OUTPATIENT)
Dept: PHYSICAL THERAPY | Facility: CLINIC | Age: 59
End: 2024-01-25
Payer: COMMERCIAL

## 2024-01-25 DIAGNOSIS — M54.12 CERVICAL RADICULOPATHY: ICD-10-CM

## 2024-01-25 DIAGNOSIS — M54.2 CERVICALGIA: Primary | ICD-10-CM

## 2024-01-25 PROCEDURE — 97110 THERAPEUTIC EXERCISES: CPT | Performed by: PHYSICAL MEDICINE & REHABILITATION

## 2024-01-25 PROCEDURE — 97112 NEUROMUSCULAR REEDUCATION: CPT | Performed by: PHYSICAL MEDICINE & REHABILITATION

## 2024-01-25 PROCEDURE — 97140 MANUAL THERAPY 1/> REGIONS: CPT | Performed by: PHYSICAL MEDICINE & REHABILITATION

## 2024-01-25 NOTE — PROGRESS NOTES
"Daily Note     Today's date: 2024  Patient name: Ever Mesa  : 1965  MRN: [id number]  Referring provider: Jose Cruz MD  Dx:   Encounter Diagnosis     ICD-10-CM    1. Cervicalgia  M54.2       2. Cervical radiculopathy  M54.12                      Subjective: Pt notes his neck actually felt pretty good yesterday morning and this morning. Notes his sx \"felt better\". Feels the GIASTM really helped. Notes the sx do return later in the day, 1* with sitting long periods only. Better with changing position/moving. Notes he is really trying to be mindful of his posture and correcting his position. NO new sx/complaints. No adverse reaction to last tx.       Objective: See treatment diary below      Assessment: Tolerated treatment well. Pt cont to demo region of tightness/adhesion in mid L UT mm and near L levator scap insertion; tolerates MT well; loosening of the tight areas noted with GIASTM and STM/DTM/TFM; pt notes it \"feels good\". Progressed scapular mm activation /postural corrective exercises today; added prone I, T,Ys; pt challenged with these exercises however without pain/sx; required tactile and verbal cues to relax Uts and engage MT and LT mms as appropriate; good return demo with cues; limited by mm fatigue; mm weakness noted. No complaints after session.  Reviewed postural awareness at home/desk/computer; pt notes he may have his keyboard too high, has to lift his hands up to type on keyboard ; this may be perpetuating strain/tension in Uts. No questions/concerns after tx. Patient demonstrated fatigue post treatment and would benefit from continued PT      Plan: Continue per plan of care.  Progress treatment as tolerated.       Precautions: IBS, chronic back pain       Re-eval Date:      Date 2024     Visit Count 1 2 3     FOTO 2024        Pain In See IE 4/10 2-3/10     Pain Out See IE 3/10 \"looser\" 2/10         Manuals 2024     STM/DTM L UT, " "suboccipitals, c-spine PVMs, levator scap; consider GIASM   STM and GIASTM instrument #4 sweeping/fanning L UT, levator scap, L c-spine PVMs pt seated to pt tolerance  STM and GIASTM instrument #4 sweeping/fanning L UT, levator scap, L c-spine PVMs pt seated to pt tolerance      Contract relax stretch L UT   3-4 trials 5-10\" with sustained end range stretch as andria pt supine  3-4 trials 5-10\" with sustained end range stretch as andria pt supine       15' total  10' total              Neuro Re-Ed        DNF training         Ita scapular mm training                                                 Ther Ex        UBE alt - L shoulder/scapular mm conditioning/endurance   80 rpm alt   10' total  80 rpm alt   10' total      L UT stretch        L levator scap stretch  Reviewed 4x30\"       Reviewed 4x30\"  3x30\" ea      3x30\" ea  Reviewed HEP    Reviewed HEP     Pec stretch         Chin tucks           B ER         MTP/LTP   Red 2x10//5\" cues/feedback Red 2x10//5\" cues/feedback/tactile facilitation      DNF endurance         Prone I/T/Ys   0# 10-15x/3-5\" ea  Cues/feedback for proper mm facilitation /tactile facilitation      Scapular 90/90 at wall, endurance hold        Scap punches           Ther Activity 10'total   pt education regarding pathophysiology/pathoanatomy of present pain/sx and condition, role of PT in improving pain/sx and function, pt education regarding activity modification to avoid exacerbation of sx and delayed recovery;  Specific pt education regarding upright static seated posture , ergonomic desk set up for work, taking breaks from long periods of static sitting, adjusting neck/head position sitting to reduce neck strain, etc    Reviewed seated postural awareness at home/desk/computer etc  Reviewed again, reviewed keyboard height to reduce UT strain/tension                     Gait Training                        Modalities                              1/18/2024  - HEP was issued and reviewed this date for " above noted exercises. Pt demonstrated understanding without incident and without questions/concerns. Will continue to update upcoming.

## 2024-01-29 ENCOUNTER — OFFICE VISIT (OUTPATIENT)
Dept: PHYSICAL THERAPY | Facility: CLINIC | Age: 59
End: 2024-01-29
Payer: COMMERCIAL

## 2024-01-29 DIAGNOSIS — M54.2 CERVICALGIA: Primary | ICD-10-CM

## 2024-01-29 DIAGNOSIS — M54.12 CERVICAL RADICULOPATHY: ICD-10-CM

## 2024-01-29 PROCEDURE — 97110 THERAPEUTIC EXERCISES: CPT

## 2024-01-29 PROCEDURE — 97112 NEUROMUSCULAR REEDUCATION: CPT

## 2024-01-29 PROCEDURE — 97140 MANUAL THERAPY 1/> REGIONS: CPT

## 2024-01-29 NOTE — PROGRESS NOTES
"Daily Note     Today's date: 2024  Patient name: Ever Mesa  : 1965  MRN: [id number]  Referring provider: Jose Cruz MD  Dx:   Encounter Diagnosis     ICD-10-CM    1. Cervicalgia  M54.2       2. Cervical radiculopathy  M54.12           Start Time: 0830  Stop Time: 09  Total time in clinic (min): 50 minutes    Subjective: \"I had a flare up last night.  I really didn't do anything, just sitting around watching TV.  I went to bed and am feeling better this morning.  Usually laying down takes the pressure off my neck and feels better.\"      Objective: See treatment diary below      Assessment: Tolerated treatment well. Quick response to GIASTM to L UT; appropriate redness, no bruising.  Side bending limitation noted during MT.  Muscle spasms noted L UT, pressure point release in area, decreased restriction and increased mobility.  Will continue to monitor and progress as able.  Patient demonstrated fatigue post treatment and would benefit from continued PT      Plan: Continue per plan of care.  Progress treatment as tolerated.       Precautions: IBS, chronic back pain       Re-eval Date:      Date 2024    Visit Count 1 2 3 4    FOTO 2024        Pain In See IE 4/10 2-3/10 2-3/10    Pain Out See IE 3/10 \"looser\" 2/10 2/10        Manuals 2024    STM/DTM L UT, suboccipitals, c-spine PVMs, levator scap; consider GIASM   STM and GIASTM instrument #4 sweeping/fanning L UT, levator scap, L c-spine PVMs pt seated to pt tolerance  STM and GIASTM instrument #4 sweeping/fanning L UT, levator scap, L c-spine PVMs pt seated to pt tolerance  STM and GIASTM instrument #4 sweeping/fanning L UT, levator scap, L c-spine PVMs pt seated to pt tolerance     Contract relax stretch L UT   3-4 trials 5-10\" with sustained end range stretch as andria pt supine  3-4 trials 5-10\" with sustained end range stretch as andria pt supine 3-4 trials 5-10\" with sustained end range " "stretch as andria pt supine      15' total  10' total  15'             Neuro Re-Ed        DNF training         Ita scapular mm training                                                 Ther Ex        UBE alt - L shoulder/scapular mm conditioning/endurance   80 rpm alt   10' total  80 rpm alt   10' total  80 rpm alt   10' total     L UT stretch        L levator scap stretch  Reviewed 4x30\"       Reviewed 4x30\"  3x30\" ea      3x30\" ea  Reviewed HEP    Reviewed HEP     Pec stretch         Chin tucks           B ER         MTP/LTP   Red 2x10//5\" cues/feedback Red 2x10//5\" cues/feedback/tactile facilitation  Red 2x10//5\" cues/feedback/tactile facilitation     DNF endurance         Prone I/T/Ys   0# 10-15x/3-5\" ea  Cues/feedback for proper mm facilitation /tactile facilitation  0# 10-15x/3-5\" ea  Cues/feedback for proper mm facilitation /tactile facilitation     Scapular 90/90 at wall, endurance hold        Scap punches           Ther Activity 10'total   pt education regarding pathophysiology/pathoanatomy of present pain/sx and condition, role of PT in improving pain/sx and function, pt education regarding activity modification to avoid exacerbation of sx and delayed recovery;  Specific pt education regarding upright static seated posture , ergonomic desk set up for work, taking breaks from long periods of static sitting, adjusting neck/head position sitting to reduce neck strain, etc    Reviewed seated postural awareness at home/desk/computer etc  Reviewed again, reviewed keyboard height to reduce UT strain/tension                     Gait Training                        Modalities                              1/18/2024  - HEP was issued and reviewed this date for above noted exercises. Pt demonstrated understanding without incident and without questions/concerns. Will continue to update upcoming.              "

## 2024-02-06 ENCOUNTER — OFFICE VISIT (OUTPATIENT)
Dept: PHYSICAL THERAPY | Facility: CLINIC | Age: 59
End: 2024-02-06
Payer: COMMERCIAL

## 2024-02-06 DIAGNOSIS — M54.2 CERVICALGIA: Primary | ICD-10-CM

## 2024-02-06 DIAGNOSIS — M54.12 CERVICAL RADICULOPATHY: ICD-10-CM

## 2024-02-06 PROCEDURE — 97110 THERAPEUTIC EXERCISES: CPT

## 2024-02-06 PROCEDURE — 97112 NEUROMUSCULAR REEDUCATION: CPT

## 2024-02-06 PROCEDURE — 97140 MANUAL THERAPY 1/> REGIONS: CPT

## 2024-02-06 NOTE — PROGRESS NOTES
"Daily Note     Today's date: 2024  Patient name: Ever Mesa  : 1965  MRN: [id number]  Referring provider: Jose Cruz MD  Dx:   Encounter Diagnosis     ICD-10-CM    1. Cervicalgia  M54.2       2. Cervical radiculopathy  M54.12           Start Time: 0745  Stop Time: 0830  Total time in clinic (min): 45 minutes    Subjective: \"I am feeling better. I went for a massage over the weekend and she feels my muscles are more relaxed with less crunching.\"      Objective: See treatment diary below      Assessment: Tolerated treatment well. Improved cervical motion noted with decreased restriction.  Responding well to GIASTM.  Continues with quick muscle fatigue in scapular muscles performing B ER and prone exercises.  Will continue to monitor and progress as able.  Patient demonstrated fatigue post treatment and would benefit from continued PT      Plan: Continue per plan of care.  Progress treatment as tolerated.       Precautions: IBS, chronic back pain       Re-eval Date:      Date 2024 2/6   Visit Count 1 2 3 4 5   FOTO 2024        Pain In See IE 4/10 2-3/10 2-3/10 1-2/10   Pain Out See IE 3/10 \"looser\" 2/10 2/10 1-2/10       Manuals 2024 2/6   STM/DTM L UT, suboccipitals, c-spine PVMs, levator scap; consider GIASM   STM and GIASTM instrument #4 sweeping/fanning L UT, levator scap, L c-spine PVMs pt seated to pt tolerance  STM and GIASTM instrument #4 sweeping/fanning L UT, levator scap, L c-spine PVMs pt seated to pt tolerance  STM and GIASTM instrument #4 sweeping/fanning L UT, levator scap, L c-spine PVMs pt seated to pt tolerance  STM and GIASTM instrument #4 sweeping/fanning L UT, levator scap, L c-spine PVMs pt seated to pt tolerance    Contract relax stretch L UT   3-4 trials 5-10\" with sustained end range stretch as andria pt supine  3-4 trials 5-10\" with sustained end range stretch as andria pt supine 3-4 trials 5-10\" with sustained end range " "stretch as andria pt supine 3-4 trials 5-10\" with sustained end range stretch as andria pt supine     15' total  10' total  15'  15'           Neuro Re-Ed        DNF training         Ita scapular mm training                                                 Ther Ex        UBE alt - L shoulder/scapular mm conditioning/endurance   80 rpm alt   10' total  80 rpm alt   10' total  80 rpm alt   10' total  L 3.0 10' alt   L UT stretch        L levator scap stretch  Reviewed 4x30\"       Reviewed 4x30\"  3x30\" ea      3x30\" ea  Reviewed HEP    Reviewed HEP     Pec stretch         Chin tucks           B ER         MTP/LTP   Red 2x10//5\" cues/feedback Red 2x10//5\" cues/feedback/tactile facilitation  Red 2x10//5\" cues/feedback/tactile facilitation     DNF endurance         Prone I/T/Ys   0# 10-15x/3-5\" ea  Cues/feedback for proper mm facilitation /tactile facilitation  0# 10-15x/3-5\" ea  Cues/feedback for proper mm facilitation /tactile facilitation     Scapular 90/90 at wall, endurance hold        Scap punches           Ther Activity 10'total   pt education regarding pathophysiology/pathoanatomy of present pain/sx and condition, role of PT in improving pain/sx and function, pt education regarding activity modification to avoid exacerbation of sx and delayed recovery;  Specific pt education regarding upright static seated posture , ergonomic desk set up for work, taking breaks from long periods of static sitting, adjusting neck/head position sitting to reduce neck strain, etc    Reviewed seated postural awareness at home/desk/computer etc  Reviewed again, reviewed keyboard height to reduce UT strain/tension                     Gait Training                        Modalities                              1/18/2024  - HEP was issued and reviewed this date for above noted exercises. Pt demonstrated understanding without incident and without questions/concerns. Will continue to update upcoming.                "

## 2024-02-08 ENCOUNTER — OFFICE VISIT (OUTPATIENT)
Dept: PHYSICAL THERAPY | Facility: CLINIC | Age: 59
End: 2024-02-08
Payer: COMMERCIAL

## 2024-02-08 DIAGNOSIS — M54.12 CERVICAL RADICULOPATHY: ICD-10-CM

## 2024-02-08 DIAGNOSIS — M54.2 CERVICALGIA: Primary | ICD-10-CM

## 2024-02-08 PROCEDURE — 97112 NEUROMUSCULAR REEDUCATION: CPT | Performed by: PHYSICAL MEDICINE & REHABILITATION

## 2024-02-08 PROCEDURE — 97140 MANUAL THERAPY 1/> REGIONS: CPT | Performed by: PHYSICAL MEDICINE & REHABILITATION

## 2024-02-08 PROCEDURE — 97110 THERAPEUTIC EXERCISES: CPT | Performed by: PHYSICAL MEDICINE & REHABILITATION

## 2024-02-08 NOTE — PROGRESS NOTES
"Daily Note     Today's date: 2024  Patient name: Ever Mesa  : 1965  MRN: [id number]  Referring provider: Jose Cruz MD  Dx:   Encounter Diagnosis     ICD-10-CM    1. Cervicalgia  M54.2       2. Cervical radiculopathy  M54.12                      Subjective: Pt notes his neck is \"still a little tight\". Overall improvements from SOC however. Notes the tightness in L UT is improved. C/c of tightness lateral /posterior c-spine/PVMs /SCM region. Notes he got a deep tissue massage; notes the therapist he had noted the tension in L UT felt better; overall sx improved after massage. Has been doing his exercises and trying to be more aware of his posture. Leaves for a cruise this weekend. Notes sx are 1* tightness only; not pain. No sx into the UE. No n/t or sensory changes.     Objective: See treatment diary below      Assessment: Tolerated treatment well. Progressing well with PT overall. Improvement in FOTO score noted. Reduced mm tension L UT mm with MT and pt subjective report. Tension noted L c-spine PVMs and more proximal UT mm where neck joins shoulder on L; improved with MT and GIASTM. Reduced endurance of DNF; increased compensation noted from SCMs; cues to inhibit SCMs and recruit DNF mms. Noted reduced sx L side after session. No radicular pain/sx. Reviewed HEP as pt will be away on vacation Next week. Reeval upon return. Pt with no questions/concerns after session; pleased with progress thus far. Patient demonstrated fatigue post treatment and would benefit from continued PT      Plan: Continue per plan of care.  Progress treatment as tolerated.       Precautions: IBS, chronic back pain       Re-eval Date:      Date        Visit Count 6       FOTO        Pain In 3/10       Pain Out 1-2/10           Manuals        STM/DTM L UT, suboccipitals, c-spine PVMs, levator scap; consider GIASM  STM and GIASTM instrument #4 sweeping/fanning L UT, levator scap, L c-spine PVMs pt seated " "to pt tolerance    , SOR and gentle manual traction c-spine supine as andria, sub occpitial and SCM stretch L supine     15'              Contract relax stretch L UT                         Neuro Re-Ed        DNF training  DNF endurance hold supine  10-12x/10\"  Manual feedback/tactile/verbal cues for proper mm facilitation and avoidance of mm/postural compensation       Ita scapular mm training         Chin tucks Supine 10x/5\"  Tactile cues/feed back for DNF activation and relaxation /inhibition of SCMs                                        Ther Ex        UBE alt - L shoulder/scapular mm conditioning/endurance  L1 10' alt        L UT stretch        L levator scap stretch  Reviewed       Reviewed        Pec stretch         Chin tucks                   B ER         MTP/LTP  Reviewed HEP        DNF endurance         Prone I/T/Ys Reviewed HEP        Scapular 90/90 at wall, endurance hold Chin tucks  at wall, red band behind head, retraction with chin tuck  Cues for neutral c-spine/l/s and proper mm activation   10-12x10\"        Scap punches           Ther Activity                        Gait Training                        Modalities                              1/18/2024  - HEP was issued and reviewed this date for above noted exercises. Pt demonstrated understanding without incident and without questions/concerns. Will continue to update upcoming.                  "

## 2024-02-20 ENCOUNTER — OFFICE VISIT (OUTPATIENT)
Dept: PHYSICAL THERAPY | Facility: CLINIC | Age: 59
End: 2024-02-20
Payer: COMMERCIAL

## 2024-02-20 DIAGNOSIS — M54.12 CERVICAL RADICULOPATHY: ICD-10-CM

## 2024-02-20 DIAGNOSIS — M54.2 CERVICALGIA: Primary | ICD-10-CM

## 2024-02-20 PROCEDURE — 97140 MANUAL THERAPY 1/> REGIONS: CPT

## 2024-02-20 PROCEDURE — 97110 THERAPEUTIC EXERCISES: CPT

## 2024-02-20 PROCEDURE — 97112 NEUROMUSCULAR REEDUCATION: CPT

## 2024-02-20 NOTE — PROGRESS NOTES
"Daily Note     Today's date: 2024  Patient name: Ever Mesa  : 1965  MRN: [id number]  Referring provider: Jose Cruz MD  Dx:   Encounter Diagnosis     ICD-10-CM    1. Cervicalgia  M54.2       2. Cervical radiculopathy  M54.12           Start Time: 0740  Stop Time: 0835  Total time in clinic (min): 55 minutes    Subjective: \"I am still getting the tightness on the L side of my neck at night when I sit down and rest.  I have been putting a heating pad on which helps.  I didn't do many exercises on vacation and I didn't feel that bad.\"      Objective: See treatment diary below      Assessment: Tolerated treatment well. Able to complete program without incident.  Good response to GIASTM to L UT at insertion at occiput.  Reviewed HEP and issued band.  Added 90/90 on wall with good understanding and demonstration.  Pt in agreement to transition to HEP.  Patient demonstrated fatigue post treatment and exhibited good technique with therapeutic exercises      Plan:  Discharge to HEP     Precautions: IBS, chronic back pain       Re-eval Date:      Date       Visit Count 6 7      FOTO        Pain In 3/10 1-2      Pain Out 1-2/10 1-2          Manuals       STM/DTM L UT, suboccipitals, c-spine PVMs, levator scap; consider GIASM  STM and GIASTM instrument #4 sweeping/fanning L UT, levator scap, L c-spine PVMs pt seated to pt tolerance    , SOR and gentle manual traction c-spine supine as andria, sub occpitial and SCM stretch L supine     15'        STM and GIASTM instrument #4 sweeping/fanning L UT, levator scap, L c-spine PVMs pt seated to pt tolerance   SOR and gentle manual traction c-spine supine as andria, sub occpitial and SCM stretch L supine     15'       Contract relax stretch L UT                         Neuro Re-Ed        DNF training  DNF endurance hold supine  10-12x/10\"  Manual feedback/tactile/verbal cues for proper mm facilitation and avoidance of mm/postural compensation " "DNF endurance hold supine  10-12x/10\"  Manual feedback/tactile/verbal cues for proper mm facilitation and avoidance of mm/postural compensation      Ita scapular mm training         Chin tucks Supine 10x/5\"  Tactile cues/feed back for DNF activation and relaxation /inhibition of SCMs  Supine 20x/5\"  Tactile cues/feed back for DNF activation and relaxation /inhibition of SCMs                                       Ther Ex        UBE alt - L shoulder/scapular mm conditioning/endurance  L1 10' alt  L4 10' alt       L UT stretch        L levator scap stretch  Reviewed       Reviewed        Pec stretch         Chin tucks                   B ER         MTP/LTP  Reviewed HEP        DNF endurance         Prone I/T/Ys Reviewed HEP        Scapular 90/90 at wall, endurance hold Chin tucks  at wall, red band behind head, retraction with chin tuck  Cues for neutral c-spine/l/s and proper mm activation   10-12x10\"  Chin tucks  at wall, red band behind head, retraction with chin tuck  Cues for neutral c-spine/l/s and proper mm activation   10-12x10\"       Scap punches     Scapular 90/90 at wall, endurance hold   Red 1x10/10\"      Ther Activity                        Gait Training                        Modalities                              1/18/2024  - HEP was issued and reviewed this date for above noted exercises. Pt demonstrated understanding without incident and without questions/concerns. Will continue to update upcoming.                    "

## 2024-02-29 NOTE — PROGRESS NOTES
Ever will be discharged from outpatient physical therapy care due to expiration of plan of care.  Last attended tx session was on 2/20 ;did not return to therapy after this date. No objective measures updated, Ever is not present at time of discharge.

## 2024-06-14 ENCOUNTER — APPOINTMENT (OUTPATIENT)
Dept: LAB | Facility: CLINIC | Age: 59
End: 2024-06-14
Payer: COMMERCIAL

## 2024-06-14 DIAGNOSIS — E78.2 MIXED HYPERLIPIDEMIA: ICD-10-CM

## 2024-06-14 DIAGNOSIS — Z12.5 SCREENING FOR PROSTATE CANCER: ICD-10-CM

## 2024-06-14 DIAGNOSIS — E78.5 HYPERLIPIDEMIA, UNSPECIFIED HYPERLIPIDEMIA TYPE: ICD-10-CM

## 2024-06-14 DIAGNOSIS — E55.9 VITAMIN D DEFICIENCY: ICD-10-CM

## 2024-06-14 DIAGNOSIS — Z00.00 ROUTINE GENERAL MEDICAL EXAMINATION AT A HEALTH CARE FACILITY: ICD-10-CM

## 2024-06-14 LAB
25(OH)D3 SERPL-MCNC: 30.5 NG/ML (ref 30–100)
ALBUMIN SERPL BCP-MCNC: 3.9 G/DL (ref 3.5–5)
ALP SERPL-CCNC: 45 U/L (ref 34–104)
ALT SERPL W P-5'-P-CCNC: 8 U/L (ref 7–52)
ANION GAP SERPL CALCULATED.3IONS-SCNC: 7 MMOL/L (ref 4–13)
AST SERPL W P-5'-P-CCNC: 15 U/L (ref 13–39)
BILIRUB SERPL-MCNC: 0.45 MG/DL (ref 0.2–1)
BUN SERPL-MCNC: 21 MG/DL (ref 5–25)
CALCIUM SERPL-MCNC: 9.1 MG/DL (ref 8.4–10.2)
CHLORIDE SERPL-SCNC: 107 MMOL/L (ref 96–108)
CHOLEST SERPL-MCNC: 256 MG/DL
CO2 SERPL-SCNC: 28 MMOL/L (ref 21–32)
CREAT SERPL-MCNC: 0.83 MG/DL (ref 0.6–1.3)
GFR SERPL CREATININE-BSD FRML MDRD: 96 ML/MIN/1.73SQ M
GLUCOSE P FAST SERPL-MCNC: 92 MG/DL (ref 65–99)
HDLC SERPL-MCNC: 57 MG/DL
LDLC SERPL CALC-MCNC: 184 MG/DL (ref 0–100)
POTASSIUM SERPL-SCNC: 4.4 MMOL/L (ref 3.5–5.3)
PROT SERPL-MCNC: 6.7 G/DL (ref 6.4–8.4)
PSA SERPL-MCNC: 1.73 NG/ML (ref 0–4)
SODIUM SERPL-SCNC: 142 MMOL/L (ref 135–147)
TRIGL SERPL-MCNC: 77 MG/DL

## 2024-06-14 PROCEDURE — 36415 COLL VENOUS BLD VENIPUNCTURE: CPT

## 2024-06-14 PROCEDURE — 82306 VITAMIN D 25 HYDROXY: CPT

## 2024-06-14 PROCEDURE — 80061 LIPID PANEL: CPT

## 2024-06-14 PROCEDURE — G0103 PSA SCREENING: HCPCS

## 2024-06-14 PROCEDURE — 80053 COMPREHEN METABOLIC PANEL: CPT

## 2024-06-17 ENCOUNTER — TELEPHONE (OUTPATIENT)
Dept: CARDIOLOGY CLINIC | Facility: CLINIC | Age: 59
End: 2024-06-17

## 2024-06-17 NOTE — TELEPHONE ENCOUNTER
----- Message from Maynor Briseno, DO sent at 6/17/2024  6:18 AM EDT -----  Please call the patient and let him know comprehensive metabolic panel is stable however lipid panel appears worse than 1 year ago.  Please schedule him for follow-up so I can discuss with him medical therapy if he is agreeable and additional next steps.

## 2024-06-17 NOTE — TELEPHONE ENCOUNTER
Rachel Somers  6/17/2024 10:07 AM EDT Back to Top      Spoke to sarah- he told me he doesn't want to make an appointment with cardiology. I told him well he should at least follow up with his PCP about his numbers. I said maybe he will change your lipitor.. and sarah responded that he isn't taking it anymore. I asked him if he wasn't feeling well on it. He said no, he just didn't want to take a statin.    Rachel Somers  6/17/2024  8:38 AM EDT       Mailbox full- will try again later

## 2024-07-30 ENCOUNTER — CONSULT (OUTPATIENT)
Dept: GASTROENTEROLOGY | Facility: CLINIC | Age: 59
End: 2024-07-30
Payer: COMMERCIAL

## 2024-07-30 VITALS
WEIGHT: 207 LBS | BODY MASS INDEX: 28.98 KG/M2 | HEIGHT: 71 IN | HEART RATE: 63 BPM | TEMPERATURE: 97.7 F | DIASTOLIC BLOOD PRESSURE: 78 MMHG | SYSTOLIC BLOOD PRESSURE: 126 MMHG | OXYGEN SATURATION: 94 %

## 2024-07-30 DIAGNOSIS — Z87.19 HISTORY OF GASTRIC POLYP: ICD-10-CM

## 2024-07-30 DIAGNOSIS — K59.00 CONSTIPATION, UNSPECIFIED CONSTIPATION TYPE: ICD-10-CM

## 2024-07-30 DIAGNOSIS — Z86.010 PERSONAL HISTORY OF COLONIC POLYPS: Primary | ICD-10-CM

## 2024-07-30 PROCEDURE — 99203 OFFICE O/P NEW LOW 30 MIN: CPT | Performed by: PHYSICIAN ASSISTANT

## 2024-07-30 RX ORDER — POLYETHYLENE GLYCOL 3350 17 G/17G
POWDER, FOR SOLUTION ORAL
Qty: 238 G | Refills: 0 | Status: SHIPPED | OUTPATIENT
Start: 2024-07-30

## 2024-07-30 NOTE — PROGRESS NOTES
[facility] - Outpatient Consultation  Ever Mesa 58 y.o. male MRN: [id number]  Encounter: [id number]    ASSESSMENT AND PLAN:      1. Personal history of colonic polyps  2. Constipation, unspecified constipation type    Patient has a personal history of adenomatous colon polyps.  He is due for a recall for surveillance purposes this year.  His bowels at times may be a bit sluggish, he attributes this to changing primarily to a keto diet and also complaining of intermittent fasting.  I did review that he may benefit from a fiber supplement with excellent hydration, or alternatively as needed or daily usage of MiraLAX if needed.    I obtained informed consent from the patient. The risks/benefits/alternatives of the procedure were discussed with the patient. Risks included, but not limited to, infection, bleeding, perforation, injury to organs in the abdomen, missed lesion and incomplete procedure were discussed. Patient was agreeable and electronic consent was signed. Miralax/gatorade/dulcolax bowel prep sent to pharmacy for pt. we did review to take approximately 4 capfuls of MiraLAX 2 days prior to the colonoscopy to start to get things moving.    - Colonoscopy; Future  - polyethylene glycol (GLYCOLAX) 17 GM/SCOOP powder; Take as directed according to bowel prep instructions  Dispense: 238 g; Refill: 0    3. History of gastric polyp    History of such, I do not have the patient's complete records.  Notes documented in the chart comment on a gastric adenoma, as well as a hyperplastic polyp with intestinal metaplasia.  His last EGD from 2021 was macroscopically unremarkable and the gastric biopsy was negative for intestinal metaplasia.  He was instructed to have a recall EGD in 3 years for surveillance, taking him to 2024.  We will proceed with an upper endoscopy now, additional surveillance recommendations deferred to endoscopist.  Continue small frequent meals and diet and lifestyle  modifications for GERD.    - EGD; Future    We will follow up as needed for development of new GI symptoms.   ______________________________________________________________________    HPI: Patient is a 58 y.o. male who presents today for a consultation regarding EGD/colon consultation. Pmhx sig for IBS, HLD, chronic back pain.    Pt is new to me. Feels well overall. Pt denies heartburn, indigestion, nausea, emesis, dysphagia or odynophagia.   No early satiety. Changed diet completely, lost 50 lbs intentionally. Did the keto diet. Since weight loss, heartburn has resolved.     Pt is doing well with moving his bowels. Having BM every 2 days or so. A bit slower on keto diet. Some rare straining. Rare episode of BRBPR related to hard stool and straining. No large volume hematochezia or melenic stool output. No nocturnal BM.     No family hx of CRC.     NSAIDs: no regular use   Etoh: rare  Tobacco: none    06/2024: BUN 21, Cr 0.83, AST 15, ALT 8, ALP 45, albumin 3.9, t bili 0.45     Endoscopic history:   EGD: 01/2021: Normal; gastric bx negative for intestinal metaplasia   Colon: 2019: tubular adenoma removed     Review of Systems   Constitutional:  Negative for appetite change, fatigue, fever and unexpected weight change.   HENT:  Negative for mouth sores and trouble swallowing.    Gastrointestinal:  Positive for constipation. Negative for abdominal pain, diarrhea, nausea and vomiting.   Skin:  Negative for rash and wound.   Neurological:  Negative for seizures and syncope.   Otherwise Per HPI    Historical Information   Past Medical History:   Diagnosis Date    IBS (irritable bowel syndrome)      Past Surgical History:   Procedure Laterality Date    COLONOSCOPY  02/15/2019    1 polyp-tubular adenoma    EGD  09/23/2016    EGD  04/23/2015    EGD  2010    antral adenoma-biopsies showed low-grade dysplasia, prepyloric lesion positive for intestinal metaplasia    EGD  11/2011    no residual lesion noted    EGD  07/2011     "Gastric adenoma    EGD  02/15/2019    Polyp antrum-hyperplastic biopsy of the stomach negative for H. pylori    EGD  01/2014    Reactive changes and biopsy was intestinal metaplasia and fundic gland polyp    FLEXIBLE SIGMOIDOSCOPY  08/01/2017    Biopsy negative for microscopic/lymphocytic colitis    STOMACH SURGERY      polyp removal     Social History   Social History     Substance and Sexual Activity   Alcohol Use Yes    Comment: occasional social drink     Social History     Substance and Sexual Activity   Drug Use No     Social History     Tobacco Use   Smoking Status Never   Smokeless Tobacco Never   Tobacco Comments    pt is a non-smoker     Family History   Problem Relation Age of Onset    Heart disease Mother     Heart disease Father     Liver cancer Father        Meds/Allergies       Current Outpatient Medications:     Cholecalciferol (Vitamin D3) 1.25 MG (23440 UT) CAPS    Omega-3 Fatty Acids (FISH OIL) 1200 MG CAPS    polyethylene glycol (GLYCOLAX) 17 GM/SCOOP powder    atorvastatin (LIPITOR) 10 mg tablet    multivitamin (THERAGRAN) TABS    sildenafil (VIAGRA) 100 mg tablet    No Known Allergies    Objective     Blood pressure 126/78, pulse 63, temperature 97.7 °F (36.5 °C), temperature source Temporal, height 5' 11\" (1.803 m), weight 93.9 kg (207 lb), SpO2 94%. Body mass index is 28.87 kg/m².    Physical Exam  Vitals and nursing note reviewed.   Constitutional:       General: He is not in acute distress.     Appearance: He is well-developed.   HENT:      Head: Normocephalic and atraumatic.   Eyes:      General: No scleral icterus.     Conjunctiva/sclera: Conjunctivae normal.   Cardiovascular:      Rate and Rhythm: Normal rate.   Pulmonary:      Effort: Pulmonary effort is normal. No respiratory distress.      Breath sounds: Normal breath sounds.   Abdominal:      General: Bowel sounds are normal. There is no distension.      Palpations: Abdomen is soft.      Tenderness: There is no abdominal tenderness. " There is no guarding.   Skin:     General: Skin is warm and dry.      Coloration: Skin is not jaundiced.   Neurological:      General: No focal deficit present.      Mental Status: He is alert.   Psychiatric:         Mood and Affect: Mood normal.        Lab Results:   No visits with results within 1 Day(s) from this visit.   Latest known visit with results is:   Appointment on 06/14/2024   Component Date Value    Cholesterol 06/14/2024 256 (H)     Triglycerides 06/14/2024 77     HDL, Direct 06/14/2024 57     LDL Calculated 06/14/2024 184 (H)     Sodium 06/14/2024 142     Potassium 06/14/2024 4.4     Chloride 06/14/2024 107     CO2 06/14/2024 28     ANION GAP 06/14/2024 7     BUN 06/14/2024 21     Creatinine 06/14/2024 0.83     Glucose, Fasting 06/14/2024 92     Calcium 06/14/2024 9.1     AST 06/14/2024 15     ALT 06/14/2024 8     Alkaline Phosphatase 06/14/2024 45     Total Protein 06/14/2024 6.7     Albumin 06/14/2024 3.9     Total Bilirubin 06/14/2024 0.45     eGFR 06/14/2024 96     Vit D, 25-Hydroxy 06/14/2024 30.5     PSA 06/14/2024 1.734      Radiology Results:   No results found.    Zulma Kelly PA-C    **Please note:  Dictation voice to text software may have been used in the creation of this record.  Occasional wrong word or “sound alike” substitutions may have occurred due to the inherent limitations of voice recognition software.  Read the chart carefully and recognize, using context, where substitutions have occurred.**

## 2024-09-17 RX ORDER — LIDOCAINE HYDROCHLORIDE 10 MG/ML
0.5 INJECTION, SOLUTION EPIDURAL; INFILTRATION; INTRACAUDAL; PERINEURAL ONCE AS NEEDED
Status: CANCELLED | OUTPATIENT
Start: 2024-09-17

## 2024-09-17 RX ORDER — SODIUM CHLORIDE, SODIUM LACTATE, POTASSIUM CHLORIDE, CALCIUM CHLORIDE 600; 310; 30; 20 MG/100ML; MG/100ML; MG/100ML; MG/100ML
125 INJECTION, SOLUTION INTRAVENOUS CONTINUOUS
Status: CANCELLED | OUTPATIENT
Start: 2024-09-17

## 2024-09-18 ENCOUNTER — ANESTHESIA EVENT (OUTPATIENT)
Dept: PERIOP | Facility: HOSPITAL | Age: 59
End: 2024-09-18
Payer: COMMERCIAL

## 2024-09-18 ENCOUNTER — ANESTHESIA (OUTPATIENT)
Dept: PERIOP | Facility: HOSPITAL | Age: 59
End: 2024-09-18
Payer: COMMERCIAL

## 2024-09-18 ENCOUNTER — HOSPITAL ENCOUNTER (OUTPATIENT)
Dept: PERIOP | Facility: HOSPITAL | Age: 59
Setting detail: OUTPATIENT SURGERY
Discharge: HOME | End: 2024-09-18
Payer: COMMERCIAL

## 2024-09-18 VITALS
DIASTOLIC BLOOD PRESSURE: 76 MMHG | TEMPERATURE: 98.7 F | OXYGEN SATURATION: 100 % | WEIGHT: 195 LBS | HEART RATE: 61 BPM | HEIGHT: 71 IN | SYSTOLIC BLOOD PRESSURE: 120 MMHG | BODY MASS INDEX: 27.3 KG/M2 | RESPIRATION RATE: 18 BRPM

## 2024-09-18 DIAGNOSIS — Z86.0100 PERSONAL HISTORY OF COLONIC POLYPS: ICD-10-CM

## 2024-09-18 DIAGNOSIS — Z87.19 HISTORY OF GASTRIC POLYP: ICD-10-CM

## 2024-09-18 DIAGNOSIS — Z86.010 PERSONAL HISTORY OF COLONIC POLYPS: ICD-10-CM

## 2024-09-18 PROCEDURE — 43239 EGD BIOPSY SINGLE/MULTIPLE: CPT | Performed by: STUDENT IN AN ORGANIZED HEALTH CARE EDUCATION/TRAINING PROGRAM

## 2024-09-18 PROCEDURE — 88305 TISSUE EXAM BY PATHOLOGIST: CPT | Performed by: PATHOLOGY

## 2024-09-18 PROCEDURE — 45385 COLONOSCOPY W/LESION REMOVAL: CPT | Performed by: STUDENT IN AN ORGANIZED HEALTH CARE EDUCATION/TRAINING PROGRAM

## 2024-09-18 RX ORDER — LIDOCAINE HYDROCHLORIDE 10 MG/ML
0.5 INJECTION, SOLUTION EPIDURAL; INFILTRATION; INTRACAUDAL; PERINEURAL ONCE AS NEEDED
Status: DISCONTINUED | OUTPATIENT
Start: 2024-09-18 | End: 2024-09-22 | Stop reason: HOSPADM

## 2024-09-18 RX ORDER — SODIUM CHLORIDE, SODIUM LACTATE, POTASSIUM CHLORIDE, CALCIUM CHLORIDE 600; 310; 30; 20 MG/100ML; MG/100ML; MG/100ML; MG/100ML
INJECTION, SOLUTION INTRAVENOUS CONTINUOUS PRN
Status: DISCONTINUED | OUTPATIENT
Start: 2024-09-18 | End: 2024-09-18

## 2024-09-18 RX ORDER — PROPOFOL 10 MG/ML
INJECTION, EMULSION INTRAVENOUS AS NEEDED
Status: DISCONTINUED | OUTPATIENT
Start: 2024-09-18 | End: 2024-09-18

## 2024-09-18 RX ORDER — SODIUM CHLORIDE, SODIUM LACTATE, POTASSIUM CHLORIDE, CALCIUM CHLORIDE 600; 310; 30; 20 MG/100ML; MG/100ML; MG/100ML; MG/100ML
125 INJECTION, SOLUTION INTRAVENOUS CONTINUOUS
Status: DISCONTINUED | OUTPATIENT
Start: 2024-09-18 | End: 2024-09-22 | Stop reason: HOSPADM

## 2024-09-18 RX ORDER — LIDOCAINE HYDROCHLORIDE 20 MG/ML
INJECTION, SOLUTION EPIDURAL; INFILTRATION; INTRACAUDAL; PERINEURAL AS NEEDED
Status: DISCONTINUED | OUTPATIENT
Start: 2024-09-18 | End: 2024-09-18

## 2024-09-18 RX ADMIN — LIDOCAINE HYDROCHLORIDE 100 MG: 20 INJECTION, SOLUTION EPIDURAL; INFILTRATION; INTRACAUDAL; PERINEURAL at 09:19

## 2024-09-18 RX ADMIN — PROPOFOL 50 MG: 10 INJECTION, EMULSION INTRAVENOUS at 09:29

## 2024-09-18 RX ADMIN — SODIUM CHLORIDE, SODIUM LACTATE, POTASSIUM CHLORIDE, AND CALCIUM CHLORIDE 125 ML/HR: .6; .31; .03; .02 INJECTION, SOLUTION INTRAVENOUS at 08:33

## 2024-09-18 RX ADMIN — PROPOFOL 50 MG: 10 INJECTION, EMULSION INTRAVENOUS at 09:22

## 2024-09-18 RX ADMIN — PROPOFOL 50 MG: 10 INJECTION, EMULSION INTRAVENOUS at 09:27

## 2024-09-18 RX ADMIN — PROPOFOL 50 MG: 10 INJECTION, EMULSION INTRAVENOUS at 09:20

## 2024-09-18 RX ADMIN — PROPOFOL 50 MG: 10 INJECTION, EMULSION INTRAVENOUS at 09:25

## 2024-09-18 RX ADMIN — PROPOFOL 150 MG: 10 INJECTION, EMULSION INTRAVENOUS at 09:19

## 2024-09-18 RX ADMIN — PROPOFOL 120 MCG/KG/MIN: 10 INJECTION, EMULSION INTRAVENOUS at 09:32

## 2024-09-18 RX ADMIN — SODIUM CHLORIDE, SODIUM LACTATE, POTASSIUM CHLORIDE, AND CALCIUM CHLORIDE: .6; .31; .03; .02 INJECTION, SOLUTION INTRAVENOUS at 09:16

## 2024-09-18 NOTE — ANESTHESIA PREPROCEDURE EVALUATION
Procedure:  COLONOSCOPY  EGD    Relevant Problems   CARDIO   (+) Chest pain      MUSCULOSKELETAL   (+) Chronic bilateral low back pain with right-sided sciatica      NEURO/PSYCH   (+) Chronic bilateral low back pain with right-sided sciatica        Physical Exam    Airway    Mallampati score: II  TM Distance: >3 FB  Neck ROM: full     Dental   No notable dental hx     Cardiovascular  Rhythm: regular, Rate: normal, Cardiovascular exam normal    Pulmonary  Pulmonary exam normal Breath sounds clear to auscultation    Other Findings        Anesthesia Plan  ASA Score- 2     Anesthesia Type- IV sedation with anesthesia with ASA Monitors.         Additional Monitors:     Airway Plan:     Comment: Discussed risks/benefits, including medication reactions, awareness, aspiration, and serious/life threatening complications. Plan to maintain native airway with IVGA, monitored with EtCO2.       Plan Factors-Exercise tolerance (METS): >4 METS.    Chart reviewed.    Patient summary reviewed.      Patient instructed to abstain from smoking on day of procedure. Patient did not smoke on day of surgery.            Induction- intravenous.    Postoperative Plan-         Informed Consent- Anesthetic plan and risks discussed with patient.  I personally reviewed this patient with the CRNA. Discussed and agreed on the Anesthesia Plan with the CRNA..

## 2024-09-18 NOTE — ANESTHESIA POSTPROCEDURE EVALUATION
Post-Op Assessment Note    CV Status:  Stable  Pain Score: 0    Pain management: adequate       Mental Status:  Alert and awake   Hydration Status:  Euvolemic   PONV Controlled:  Controlled   Airway Patency:  Patent     Post Op Vitals Reviewed: Yes    No anethesia notable event occurred.    Staff: CRNA               BP   107/61   Temp 98.7   Pulse 75   Resp 12   SpO2 98

## 2024-09-18 NOTE — H&P
[facility]  History & Physical     PATIENT INFO     Name: Ever Mesa  YOB: 1965   Age: 58 y.o.   Sex: male   MRN: [id number]     HISTORY OF PRESENT ILLNESS     Ever Mesa is a 58 y.o. year old male who presents for EGD and colonoscopy for history of gastric polyp, history of colon polyps.  Last colonoscopy in 2019.  No antithrombotics or anticoagulants.     REVIEW OF SYSTEMS     Per the HPI, and otherwise unremarkable.    Historical Information   Past Medical History:   Diagnosis Date    IBS (irritable bowel syndrome)      Past Surgical History:   Procedure Laterality Date    COLONOSCOPY  02/15/2019    1 polyp-tubular adenoma    EGD  09/23/2016    EGD  04/23/2015    EGD  2010    antral adenoma-biopsies showed low-grade dysplasia, prepyloric lesion positive for intestinal metaplasia    EGD  11/2011    no residual lesion noted    EGD  07/2011    Gastric adenoma    EGD  02/15/2019    Polyp antrum-hyperplastic biopsy of the stomach negative for H. pylori    EGD  01/2014    Reactive changes and biopsy was intestinal metaplasia and fundic gland polyp    FLEXIBLE SIGMOIDOSCOPY  08/01/2017    Biopsy negative for microscopic/lymphocytic colitis    STOMACH SURGERY      polyp removal     Social History   Social History     Substance and Sexual Activity   Alcohol Use Yes    Comment: occasional social drink     Social History     Substance and Sexual Activity   Drug Use No     Social History     Tobacco Use   Smoking Status Never   Smokeless Tobacco Never   Tobacco Comments    pt is a non-smoker     Family History   Problem Relation Age of Onset    Heart disease Mother     Heart disease Father     Liver cancer Father         MEDICATIONS & ALLERGIES     Current Outpatient Medications   Medication Instructions    atorvastatin (LIPITOR) 10 mg, Oral, Daily    Cholecalciferol (Vitamin D3) 1.25 MG (14168 UT) CAPS No dose, route, or frequency recorded.    multivitamin  "(THERAGRAN) TABS 1 tablet, Daily    Omega-3 Fatty Acids (FISH OIL) 1200 MG CAPS 1 capsule, Oral, Daily    polyethylene glycol (GLYCOLAX) 17 GM/SCOOP powder Take as directed according to bowel prep instructions     No Known Allergies     PHYSICAL EXAM      Objective   Blood pressure 139/77, pulse 77, temperature (!) 97.1 °F (36.2 °C), temperature source Tympanic, resp. rate 18, height 5' 11\" (1.803 m), weight 88.5 kg (195 lb), SpO2 97%. Body mass index is 27.2 kg/m².    General Appearance:   Alert, cooperative, no distress   Lungs:   Equal chest rise, respirations unlabored    Heart:   Regular rate and rhythm   Abdomen:   Soft, non-tender, non-distended; normal bowel sounds; no masses, no organomegaly    Extremities:   No edema       ASSESSMENT & PLAN     This is a 58 y.o. year old male here for EGD and colonoscopy, and he is stable and optimized for his procedure.      Sebastian Mora D.O.  [facility]  Division of Gastroenterology & Hepatology  Available on [employer]  [email or web address]    ** Please Note: This note is constructed using a voice recognition dictation system. **  "

## 2024-09-24 PROCEDURE — 88305 TISSUE EXAM BY PATHOLOGIST: CPT | Performed by: PATHOLOGY

## 2024-10-01 ENCOUNTER — TELEPHONE (OUTPATIENT)
Dept: GASTROENTEROLOGY | Facility: CLINIC | Age: 59
End: 2024-10-01

## 2024-10-01 NOTE — TELEPHONE ENCOUNTER
----- Message from Sebastian Mora DO sent at 10/1/2024  1:06 PM EDT -----  Hi,    Please contact patient.  Inform him that the gastric polyp was a benign fundic gland polyp.  There were no concerning findings.  He likely does not require any further surveillance.    The colon polyp that we removed was a benign but precancerous polyp called a tubular adenoma.  There were no concerning findings.    Recommend next colonoscopy in 7 years.    Thank you.    Sebastian Mora D.O.  [facility]  Division of Gastroenterology & Hepatology  Available on [facility]`t  [email or web address]

## 2025-01-03 ENCOUNTER — HOSPITAL ENCOUNTER (OUTPATIENT)
Dept: ULTRASOUND IMAGING | Facility: HOSPITAL | Age: 60
Discharge: HOME | End: 2025-01-03
Payer: COMMERCIAL

## 2025-01-03 DIAGNOSIS — R10.84 GENERALIZED ABDOMINAL PAIN: ICD-10-CM

## 2025-01-03 PROCEDURE — 76700 US EXAM ABDOM COMPLETE: CPT
